# Patient Record
Sex: FEMALE | Race: WHITE | HISPANIC OR LATINO | URBAN - METROPOLITAN AREA
[De-identification: names, ages, dates, MRNs, and addresses within clinical notes are randomized per-mention and may not be internally consistent; named-entity substitution may affect disease eponyms.]

---

## 2014-12-05 RX ORDER — SERTRALINE 25 MG/1
2 TABLET, FILM COATED ORAL
Qty: 0 | Refills: 0 | COMMUNITY
Start: 2014-12-05

## 2014-12-05 RX ORDER — METFORMIN HYDROCHLORIDE 850 MG/1
1 TABLET ORAL
Qty: 0 | Refills: 0 | DISCHARGE
Start: 2014-12-05

## 2014-12-05 RX ORDER — INSULIN GLARGINE 100 [IU]/ML
45 INJECTION, SOLUTION SUBCUTANEOUS
Qty: 0 | Refills: 0 | DISCHARGE
Start: 2014-12-05

## 2018-02-13 ENCOUNTER — INPATIENT (INPATIENT)
Facility: HOSPITAL | Age: 63
LOS: 0 days | Discharge: LEFT BEFORE DISCHARGE | DRG: 379 | End: 2018-02-13
Attending: INTERNAL MEDICINE | Admitting: INTERNAL MEDICINE
Payer: COMMERCIAL

## 2018-02-13 VITALS — RESPIRATION RATE: 18 BRPM | OXYGEN SATURATION: 98 %

## 2018-02-13 VITALS
RESPIRATION RATE: 18 BRPM | TEMPERATURE: 98 F | OXYGEN SATURATION: 98 % | HEIGHT: 62 IN | SYSTOLIC BLOOD PRESSURE: 149 MMHG | DIASTOLIC BLOOD PRESSURE: 81 MMHG | WEIGHT: 199.96 LBS | HEART RATE: 83 BPM

## 2018-02-13 DIAGNOSIS — E11.9 TYPE 2 DIABETES MELLITUS WITHOUT COMPLICATIONS: ICD-10-CM

## 2018-02-13 DIAGNOSIS — K92.2 GASTROINTESTINAL HEMORRHAGE, UNSPECIFIED: ICD-10-CM

## 2018-02-13 DIAGNOSIS — F32.9 MAJOR DEPRESSIVE DISORDER, SINGLE EPISODE, UNSPECIFIED: ICD-10-CM

## 2018-02-13 DIAGNOSIS — E78.5 HYPERLIPIDEMIA, UNSPECIFIED: ICD-10-CM

## 2018-02-13 DIAGNOSIS — K62.5 HEMORRHAGE OF ANUS AND RECTUM: ICD-10-CM

## 2018-02-13 DIAGNOSIS — K46.9 UNSPECIFIED ABDOMINAL HERNIA WITHOUT OBSTRUCTION OR GANGRENE: Chronic | ICD-10-CM

## 2018-02-13 DIAGNOSIS — Z29.9 ENCOUNTER FOR PROPHYLACTIC MEASURES, UNSPECIFIED: ICD-10-CM

## 2018-02-13 LAB
ALBUMIN SERPL ELPH-MCNC: 3.2 G/DL — LOW (ref 3.5–5)
ALP SERPL-CCNC: 108 U/L — SIGNIFICANT CHANGE UP (ref 40–120)
ALT FLD-CCNC: 14 U/L DA — SIGNIFICANT CHANGE UP (ref 10–60)
ANION GAP SERPL CALC-SCNC: 8 MMOL/L — SIGNIFICANT CHANGE UP (ref 5–17)
ANION GAP SERPL CALC-SCNC: 9 MMOL/L — SIGNIFICANT CHANGE UP (ref 5–17)
APTT BLD: 29.9 SEC — SIGNIFICANT CHANGE UP (ref 27.5–37.4)
AST SERPL-CCNC: 15 U/L — SIGNIFICANT CHANGE UP (ref 10–40)
BASOPHILS # BLD AUTO: 0.1 K/UL — SIGNIFICANT CHANGE UP (ref 0–0.2)
BASOPHILS NFR BLD AUTO: 0.8 % — SIGNIFICANT CHANGE UP (ref 0–2)
BILIRUB SERPL-MCNC: 0.3 MG/DL — SIGNIFICANT CHANGE UP (ref 0.2–1.2)
BUN SERPL-MCNC: 12 MG/DL — SIGNIFICANT CHANGE UP (ref 7–18)
BUN SERPL-MCNC: 14 MG/DL — SIGNIFICANT CHANGE UP (ref 7–18)
CALCIUM SERPL-MCNC: 8.8 MG/DL — SIGNIFICANT CHANGE UP (ref 8.4–10.5)
CALCIUM SERPL-MCNC: 9.2 MG/DL — SIGNIFICANT CHANGE UP (ref 8.4–10.5)
CHLORIDE SERPL-SCNC: 104 MMOL/L — SIGNIFICANT CHANGE UP (ref 96–108)
CHLORIDE SERPL-SCNC: 105 MMOL/L — SIGNIFICANT CHANGE UP (ref 96–108)
CHOLEST SERPL-MCNC: 154 MG/DL — SIGNIFICANT CHANGE UP (ref 10–199)
CO2 SERPL-SCNC: 24 MMOL/L — SIGNIFICANT CHANGE UP (ref 22–31)
CO2 SERPL-SCNC: 27 MMOL/L — SIGNIFICANT CHANGE UP (ref 22–31)
CREAT SERPL-MCNC: 1 MG/DL — SIGNIFICANT CHANGE UP (ref 0.5–1.3)
CREAT SERPL-MCNC: 1.04 MG/DL — SIGNIFICANT CHANGE UP (ref 0.5–1.3)
EOSINOPHIL # BLD AUTO: 0.2 K/UL — SIGNIFICANT CHANGE UP (ref 0–0.5)
EOSINOPHIL NFR BLD AUTO: 2.2 % — SIGNIFICANT CHANGE UP (ref 0–6)
GLUCOSE SERPL-MCNC: 139 MG/DL — HIGH (ref 70–99)
GLUCOSE SERPL-MCNC: 154 MG/DL — HIGH (ref 70–99)
HCT VFR BLD CALC: 38.7 % — SIGNIFICANT CHANGE UP (ref 34.5–45)
HCT VFR BLD CALC: 39.8 % — SIGNIFICANT CHANGE UP (ref 34.5–45)
HDLC SERPL-MCNC: 51 MG/DL — SIGNIFICANT CHANGE UP (ref 40–125)
HGB BLD-MCNC: 12 G/DL — SIGNIFICANT CHANGE UP (ref 11.5–15.5)
HGB BLD-MCNC: 12.5 G/DL — SIGNIFICANT CHANGE UP (ref 11.5–15.5)
INR BLD: 1.04 RATIO — SIGNIFICANT CHANGE UP (ref 0.88–1.16)
LIPID PNL WITH DIRECT LDL SERPL: 63 MG/DL — SIGNIFICANT CHANGE UP
LYMPHOCYTES # BLD AUTO: 1.8 K/UL — SIGNIFICANT CHANGE UP (ref 1–3.3)
LYMPHOCYTES # BLD AUTO: 17.8 % — SIGNIFICANT CHANGE UP (ref 13–44)
MAGNESIUM SERPL-MCNC: 1.8 MG/DL — SIGNIFICANT CHANGE UP (ref 1.6–2.6)
MCHC RBC-ENTMCNC: 24.5 PG — LOW (ref 27–34)
MCHC RBC-ENTMCNC: 24.8 PG — LOW (ref 27–34)
MCHC RBC-ENTMCNC: 30.9 GM/DL — LOW (ref 32–36)
MCHC RBC-ENTMCNC: 31.4 GM/DL — LOW (ref 32–36)
MCV RBC AUTO: 79 FL — LOW (ref 80–100)
MCV RBC AUTO: 79.5 FL — LOW (ref 80–100)
MONOCYTES # BLD AUTO: 0.5 K/UL — SIGNIFICANT CHANGE UP (ref 0–0.9)
MONOCYTES NFR BLD AUTO: 4.8 % — SIGNIFICANT CHANGE UP (ref 2–14)
NEUTROPHILS # BLD AUTO: 7.4 K/UL — SIGNIFICANT CHANGE UP (ref 1.8–7.4)
NEUTROPHILS NFR BLD AUTO: 74.3 % — SIGNIFICANT CHANGE UP (ref 43–77)
PHOSPHATE SERPL-MCNC: 3.2 MG/DL — SIGNIFICANT CHANGE UP (ref 2.5–4.5)
PLATELET # BLD AUTO: 240 K/UL — SIGNIFICANT CHANGE UP (ref 150–400)
PLATELET # BLD AUTO: 256 K/UL — SIGNIFICANT CHANGE UP (ref 150–400)
POTASSIUM SERPL-MCNC: 4 MMOL/L — SIGNIFICANT CHANGE UP (ref 3.5–5.3)
POTASSIUM SERPL-MCNC: 4.4 MMOL/L — SIGNIFICANT CHANGE UP (ref 3.5–5.3)
POTASSIUM SERPL-SCNC: 4 MMOL/L — SIGNIFICANT CHANGE UP (ref 3.5–5.3)
POTASSIUM SERPL-SCNC: 4.4 MMOL/L — SIGNIFICANT CHANGE UP (ref 3.5–5.3)
PROT SERPL-MCNC: 6.8 G/DL — SIGNIFICANT CHANGE UP (ref 6–8.3)
PROTHROM AB SERPL-ACNC: 11.4 SEC — SIGNIFICANT CHANGE UP (ref 9.8–12.7)
RBC # BLD: 4.87 M/UL — SIGNIFICANT CHANGE UP (ref 3.8–5.2)
RBC # BLD: 5.04 M/UL — SIGNIFICANT CHANGE UP (ref 3.8–5.2)
RBC # FLD: 15.3 % — HIGH (ref 10.3–14.5)
RBC # FLD: 15.5 % — HIGH (ref 10.3–14.5)
SODIUM SERPL-SCNC: 138 MMOL/L — SIGNIFICANT CHANGE UP (ref 135–145)
SODIUM SERPL-SCNC: 139 MMOL/L — SIGNIFICANT CHANGE UP (ref 135–145)
TOTAL CHOLESTEROL/HDL RATIO MEASUREMENT: 3 RATIO — LOW (ref 3.3–7.1)
TRIGL SERPL-MCNC: 200 MG/DL — HIGH (ref 10–149)
TSH SERPL-MCNC: 1.91 UU/ML — SIGNIFICANT CHANGE UP (ref 0.34–4.82)
WBC # BLD: 10.5 K/UL — SIGNIFICANT CHANGE UP (ref 3.8–10.5)
WBC # BLD: 9.9 K/UL — SIGNIFICANT CHANGE UP (ref 3.8–10.5)
WBC # FLD AUTO: 10.5 K/UL — SIGNIFICANT CHANGE UP (ref 3.8–10.5)
WBC # FLD AUTO: 9.9 K/UL — SIGNIFICANT CHANGE UP (ref 3.8–10.5)

## 2018-02-13 PROCEDURE — 83036 HEMOGLOBIN GLYCOSYLATED A1C: CPT

## 2018-02-13 PROCEDURE — 84443 ASSAY THYROID STIM HORMONE: CPT

## 2018-02-13 PROCEDURE — 86850 RBC ANTIBODY SCREEN: CPT

## 2018-02-13 PROCEDURE — 93005 ELECTROCARDIOGRAM TRACING: CPT

## 2018-02-13 PROCEDURE — 84100 ASSAY OF PHOSPHORUS: CPT

## 2018-02-13 PROCEDURE — 99285 EMERGENCY DEPT VISIT HI MDM: CPT

## 2018-02-13 PROCEDURE — 99285 EMERGENCY DEPT VISIT HI MDM: CPT | Mod: 25

## 2018-02-13 PROCEDURE — 85610 PROTHROMBIN TIME: CPT

## 2018-02-13 PROCEDURE — 80048 BASIC METABOLIC PNL TOTAL CA: CPT

## 2018-02-13 PROCEDURE — 83735 ASSAY OF MAGNESIUM: CPT

## 2018-02-13 PROCEDURE — 80053 COMPREHEN METABOLIC PANEL: CPT

## 2018-02-13 PROCEDURE — 74177 CT ABD & PELVIS W/CONTRAST: CPT

## 2018-02-13 PROCEDURE — 85027 COMPLETE CBC AUTOMATED: CPT

## 2018-02-13 PROCEDURE — 85730 THROMBOPLASTIN TIME PARTIAL: CPT

## 2018-02-13 PROCEDURE — 86901 BLOOD TYPING SEROLOGIC RH(D): CPT

## 2018-02-13 PROCEDURE — 86900 BLOOD TYPING SEROLOGIC ABO: CPT

## 2018-02-13 PROCEDURE — 80061 LIPID PANEL: CPT

## 2018-02-13 PROCEDURE — 74177 CT ABD & PELVIS W/CONTRAST: CPT | Mod: 26

## 2018-02-13 PROCEDURE — 82962 GLUCOSE BLOOD TEST: CPT

## 2018-02-13 RX ORDER — OXYBUTYNIN CHLORIDE 5 MG
5 TABLET ORAL DAILY
Qty: 0 | Refills: 0 | Status: DISCONTINUED | OUTPATIENT
Start: 2018-02-13 | End: 2018-02-13

## 2018-02-13 RX ORDER — MECLIZINE HCL 12.5 MG
1 TABLET ORAL
Qty: 0 | Refills: 0 | COMMUNITY

## 2018-02-13 RX ORDER — OXYBUTYNIN CHLORIDE 5 MG
1 TABLET ORAL
Qty: 0 | Refills: 0 | COMMUNITY

## 2018-02-13 RX ORDER — SERTRALINE 25 MG/1
200 TABLET, FILM COATED ORAL AT BEDTIME
Qty: 0 | Refills: 0 | Status: DISCONTINUED | OUTPATIENT
Start: 2018-02-13 | End: 2018-02-13

## 2018-02-13 RX ORDER — BUPROPION HYDROCHLORIDE 150 MG/1
0 TABLET, EXTENDED RELEASE ORAL
Qty: 0 | Refills: 0 | COMMUNITY

## 2018-02-13 RX ORDER — INSULIN LISPRO 100/ML
VIAL (ML) SUBCUTANEOUS
Qty: 0 | Refills: 0 | Status: DISCONTINUED | OUTPATIENT
Start: 2018-02-13 | End: 2018-02-13

## 2018-02-13 RX ORDER — SODIUM CHLORIDE 9 MG/ML
1000 INJECTION INTRAMUSCULAR; INTRAVENOUS; SUBCUTANEOUS
Qty: 0 | Refills: 0 | Status: DISCONTINUED | OUTPATIENT
Start: 2018-02-13 | End: 2018-02-13

## 2018-02-13 RX ORDER — PANTOPRAZOLE SODIUM 20 MG/1
40 TABLET, DELAYED RELEASE ORAL
Qty: 0 | Refills: 0 | Status: DISCONTINUED | OUTPATIENT
Start: 2018-02-13 | End: 2018-02-13

## 2018-02-13 RX ORDER — SOD SULF/SODIUM/NAHCO3/KCL/PEG
4000 SOLUTION, RECONSTITUTED, ORAL ORAL ONCE
Qty: 0 | Refills: 0 | Status: COMPLETED | OUTPATIENT
Start: 2018-02-13 | End: 2018-02-13

## 2018-02-13 RX ORDER — ATORVASTATIN CALCIUM 80 MG/1
20 TABLET, FILM COATED ORAL AT BEDTIME
Qty: 0 | Refills: 0 | Status: DISCONTINUED | OUTPATIENT
Start: 2018-02-13 | End: 2018-02-13

## 2018-02-13 RX ADMIN — SODIUM CHLORIDE 80 MILLILITER(S): 9 INJECTION INTRAMUSCULAR; INTRAVENOUS; SUBCUTANEOUS at 15:00

## 2018-02-13 RX ADMIN — Medication 4000 MILLILITER(S): at 19:33

## 2018-02-13 RX ADMIN — PANTOPRAZOLE SODIUM 40 MILLIGRAM(S): 20 TABLET, DELAYED RELEASE ORAL at 18:57

## 2018-02-13 NOTE — DISCHARGE NOTE ADULT - PATIENT PORTAL LINK FT
You can access the [facility] Patient Portal, offered by [facility], by registering with the following website: [email or web address]

## 2018-02-13 NOTE — DISCHARGE NOTE ADULT - MEDICATION SUMMARY - MEDICATIONS TO TAKE
I will START or STAY ON the medications listed below when I get home from the hospital:    Xarelto 20 mg oral tablet  -- 1 tab(s) by mouth once a day (in the evening)  -- Indication: For Prophylactic measure    sertraline 100 mg oral tablet  -- 2 tab(s) by mouth once a day (at bedtime)  -- Indication: For Depression    metFORMIN 500 mg oral tablet  -- 1 tab(s) by mouth 2 times a day  -- Indication: For Diabetes    insulin glargine 100 units/mL subcutaneous solution  -- 45 unit(s) subcutaneous once a day (at bedtime)  -- Indication: For Diabetes    glipiZIDE  -- orally once a day  -- Indication: For Diabetes    Trulicity Pen  -- subcutaneous once a week  -- Indication: For Diabetes    meclizine 12.5 mg oral tablet  -- 1 tab(s) by mouth 3 times a day  -- Indication: For vertigo    fenofibrate 48 mg oral tablet  -- 1 tab(s) by mouth once a day (at bedtime)  -- Indication: For HLD    Lipitor 20 mg oral tablet  -- 1 tab(s) by mouth once a day (at bedtime)  -- Indication: For HLD    docusate sodium 100 mg oral capsule  -- 1 cap(s) by mouth 2 times a day  -- Indication: For constipation    senna oral tablet  -- 1 tab(s) by mouth 2 times a day  -- Indication: For constipation    buPROPion  -- orally once a day  -- Indication: For smoking    oxybutynin 5 mg oral tablet  -- 1 tab(s) orally  -- Indication: For incontinence
alert

## 2018-02-13 NOTE — H&P ADULT - NEGATIVE OPHTHALMOLOGIC SYMPTOMS
no diplopia/no lacrimation R/no blurred vision L/no photophobia/no blurred vision R/no lacrimation L

## 2018-02-13 NOTE — ED ADULT NURSE NOTE - OBJECTIVE STATEMENT
Pt. stated she woke up to use the bathroom and had a lot of blood after a bowel movement. Pt. stated she is a "little constipated".

## 2018-02-13 NOTE — H&P ADULT - PROBLEM SELECTOR PLAN 5
-Improved VTE score of 2 (age and immobilization)  -No pharmacologic DVT ppx 2/2 GI bleed  -SCDs for now  -GI ppx with Protonix.

## 2018-02-13 NOTE — H&P ADULT - ASSESSMENT
62 years old female from home, AAO x 3, ambulatory, stays with daughter and boyfriend, PMH of DM (On Lantus and Metformin), HLD and Depression and PSH of abdominal hernia repair (2005)  presented to ED with an episode of BRBPR while straining her bowel today.     In ED, patient is looking comfortable. Initial vitals: T 98.1, HR 83, /81, RR 18 and spO2 98% on room air. H/H upon admission 12/38.7. Rectal exam positive for BRBPR. CT abdomen: Apparent slightly hyperdense structure measuring approximately 2.8 cm in the rectum. This may represent stool, polyp, or rectal cancer. Admitted to medicine for further work up.

## 2018-02-13 NOTE — H&P ADULT - HISTORY OF PRESENT ILLNESS
62 years old female from home, AAO x 3, ambulatory, stays with daughter and boyfriend, PMH of DM (On Lantus and Metformin), HLD and Depression and PSH of abdominal hernia repair (2005)  presented to ED with an episode of BRBPR while straining her bowel today. Patient states that she went to [facility] yesterday for at 2 pm and later developed one episode of watery diarrhea in evening which resolved. Today early morning, patient was straining her bowels around 2:30 am, and had an episode of BRBPR. No more episodes since then. Currently denies any abdominal pain, nausea, vomiting, diarrhea, dizziness, headache, fever, chills, cough, chest pain, palpitations, sob or any other complains. No recent changes in weight or appetite, no recent travel, no recent antibiotic use. Denies being on blood thinner. Admits to taking Advil once in a while.     In ED, patient is looking comfortable. Initial vitals: T 98.1, HR 83, /81, RR 18 and spO2 98% on room air. H/H upon admission 12/38.7. Rectal exam positive for BRBPR. CT abdomen: Apparent slightly hyperdense structure measuring approximately 2.8 cm in the rectum. This may represent stool, polyp, or rectal cancer. Admitted to medicine for further work up. 62 years old female from home, AAO x 3, ambulatory, stays with daughter and boyfriend, PMH of DM (On Lantus and Metformin), HLD and Depression and PSH of abdominal hernia repair (2005)  presented to ED with an episode of BRBPR while straining her bowel today. Patient states that she went to [facility] yesterday for at 2 pm and later developed one episode of watery diarrhea in evening which resolved. Today early morning, patient was straining her bowels around 2:30 am, and had an episode of BRBPR. No more episodes since then. Currently denies any abdominal pain, nausea, vomiting, diarrhea, dizziness, headache, fever, chills, cough, chest pain, palpitations, sob or any other complains. No recent changes in weight or appetite, no recent travel, no recent antibiotic use. Denies being on blood thinner. Admits to taking Advil once in a while. Never had Colonoscopy in past.     In ED, patient is looking comfortable. Initial vitals: T 98.1, HR 83, /81, RR 18 and spO2 98% on room air. H/H upon admission 12/38.7. Rectal exam positive for BRBPR. CT abdomen: Apparent slightly hyperdense structure measuring approximately 2.8 cm in the rectum. This may represent stool, polyp, or rectal cancer. Admitted to medicine for further work up.

## 2018-02-13 NOTE — DISCHARGE NOTE ADULT - HOSPITAL COURSE
62 years old female from home, AAO x 3, ambulatory, stays with daughter and boyfriend, PMH of DM (On Lantus and Metformin), HLD and Depression and PSH of abdominal hernia repair (2005)  presented to ED with an episode of BRBPR while straining her bowel today. Patient states that she went to [facility] yesterday for at 2 pm and later developed one episode of watery diarrhea in evening which resolved. Today early morning, patient was straining her bowels around 2:30 am, and had an episode of BRBPR. No more episodes since then. Currently denies any abdominal pain, nausea, vomiting, diarrhea, dizziness, headache, fever, chills, cough, chest pain, palpitations, sob or any other complains. No recent changes in weight or appetite, no recent travel, no recent antibiotic use. Denies being on blood thinner. Admits to taking Advil once in a while. Never had Colonoscopy in past.     In ED, patient is looking comfortable. Initial vitals: T 98.1, HR 83, /81, RR 18 and spO2 98% on room air. H/H upon admission 12/38.7. Rectal exam positive for BRBPR. CT abdomen: Apparent slightly hyperdense structure measuring approximately 2.8 cm in the rectum. This may represent stool, polyp, or rectal cancer. Admitted to medicine for further work up.     In the hospital, you were treated for GI bleed. Please follow-up with your GI doctor.

## 2018-02-13 NOTE — DISCHARGE NOTE ADULT - CARE PLAN
Principal Discharge DX:	GI bleed  Goal:	Resolve  Assessment and plan of treatment:	Please continue medications, do not take any NSAIDs.

## 2018-02-13 NOTE — ED PROVIDER NOTE - OBJECTIVE STATEMENT
63 y/o F pt with PMHx of DM, HLD, presents to ED c/o rectal bleeding while straining her bowels x today. Pt denies fever, chills, nausea, vomiting, diarrhea, abd pain, H/O hemorrhoids, or any other complaints. NKDA.

## 2018-02-13 NOTE — ED ADULT NURSE REASSESSMENT NOTE - NS ED NURSE REASSESS COMMENT FT1
Pt. is drinking PO contrast for CT scan. No complaints voiced at this time. Pt. verbalized pain improvement since being here. Endorsed to DRAGAN Rios to provide continued care.
1830 patient BEGAN DRINKING GOLYLELY   1900 PATIENT REFUSING TO COMPLETE LAXATIVE  WANTS TI GO HOME   1910  DR MENDEZ SAW PAGED TO INFORM OF SAME.   1925 SALINE LOCK D/C   1935 AWAITING PAPERS FROM MD   2010 PATIENT WAS NOT AT BEDSIDE TO GIVE PAPER-PT LEFT PRIOR TO DISCHARGE PAPERS.  DR MENDEZ SAW MADE AWARE OF SAME.   BEGINNING AT 1830 PATIENT INFORMED OF THE IMPORTANCE OF REMAINING IN THE HOSPITAL FOR EVAL OF RECTAL BLEEDING.  PT REFUSED.
PATIENT WAS ADMITTED AND SCHEDULED FOR COLONOSCOPY IN THE MORNING. SALINE LOCK D/C

## 2018-02-13 NOTE — H&P ADULT - PROBLEM SELECTOR PLAN 1
-BRBPR x 1 episode, no prior history, not on any blood thinner, takes Advil once in a while.   -Rectal exam positive for bright red blood  -H/H upon admission 12/38.7  -Monitor serial H/H  -Check Orthostatics  -c/w gentle hydration, monitor vitals closely  -Avoid NSAIDs  -Clears for now  -Protonix 40 IV BID  -GI evaluation. -BRBPR x 1 episode, no prior history, not on any blood thinner, takes Advil once in a while.   -Rectal exam positive for bright red blood  -H/H upon admission 12/38.7  -Monitor serial H/H  -Check Orthostatics  -c/w gentle hydration, monitor vitals closely  -Avoid NSAIDs  -Clears for now  -Protonix 40 IV BID  -GI evaluation, Dr. Crowder consulted

## 2018-02-13 NOTE — H&P ADULT - PROBLEM SELECTOR PLAN 2
-Patient is on Lantus 45 U qhs, Metformin 500 BID and Trulicity once a week.  -Will hold Metformin and Trulicity for now.  -Continue Lantus at half the home dose along with HSS  -Monitor accu checks and adjust insulin regimen as needed  -f/u HbA1c

## 2018-02-13 NOTE — H&P ADULT - FAMILY HISTORY
Grandparent  Still living? Unknown  Family history of diabetes mellitus, Age at diagnosis: Age Unknown     Father  Still living? Unknown  Family history of prostate cancer, Age at diagnosis: Age Unknown

## 2018-02-13 NOTE — ED PROVIDER NOTE - RECTAL
bright red blood on rectal with 4cm x 4cm rectal mass protruding from rectum which was reducible bright red blood on rectal with 4cm x 4cm rectal mass protruding from rectum

## 2018-02-14 LAB — HBA1C BLD-MCNC: 6.8 % — HIGH (ref 4–5.6)

## 2022-05-31 NOTE — ED ADULT NURSE NOTE - EXTENSIONS OF SELF_ADULT
"  Subjective:       Patient ID: Rachel Morales is a 44 y.o. female.    Chief Complaint: Annual Exam    44-year-old  female patient with Patient Active Problem List:     Obesity (BMI 30.0-34.9)  Here for routine annual physicals.   Denies any extreme fatigue and menstrual cycles have been stable, LMP 05/17/2022, reports occasionally heavy cycles  Denies any extreme fatigue    Review of Systems   Constitutional: Positive for unexpected weight change. Negative for activity change and fatigue.   HENT: Negative for hearing loss, rhinorrhea and trouble swallowing.    Eyes: Negative for discharge and visual disturbance.   Respiratory: Negative for chest tightness, shortness of breath and wheezing.    Cardiovascular: Negative for chest pain, palpitations and leg swelling.   Gastrointestinal: Negative for abdominal pain, blood in stool, constipation, diarrhea, nausea and vomiting.   Endocrine: Negative for polydipsia and polyuria.   Genitourinary: Positive for menstrual problem. Negative for difficulty urinating, dysuria and hematuria.   Musculoskeletal: Positive for neck pain. Negative for arthralgias, joint swelling and myalgias.   Skin: Negative for rash.   Neurological: Negative for weakness, light-headedness and headaches.   Psychiatric/Behavioral: Negative for confusion, dysphoric mood and sleep disturbance.         /76 (BP Location: Right arm, Patient Position: Sitting, BP Method: Large (Manual))   Pulse 73   Resp 16   Ht 5' 7" (1.702 m)   Wt 87 kg (191 lb 12.8 oz)   SpO2 99%   BMI 30.04 kg/m²   Objective:      Physical Exam  Constitutional:       Appearance: She is well-developed.   HENT:      Head: Normocephalic and atraumatic.   Cardiovascular:      Rate and Rhythm: Normal rate and regular rhythm.      Heart sounds: Normal heart sounds. No murmur heard.  Pulmonary:      Effort: Pulmonary effort is normal.      Breath sounds: Normal breath sounds. No wheezing.   Abdominal:      " General: Bowel sounds are normal.      Palpations: Abdomen is soft.      Tenderness: There is no abdominal tenderness.   Skin:     General: Skin is warm and dry.      Findings: No rash.   Neurological:      Mental Status: She is alert and oriented to person, place, and time.   Psychiatric:         Mood and Affect: Mood normal.           Assessment/Plan:   1. Routine general medical examination at a health care facility  - CBC Auto Differential; Future  - Comprehensive Metabolic Panel; Future  - Lipid Panel; Future  - TSH; Future  - Urinalysis, Reflex to Urine Culture Urine, Clean Catch; Future  - Hepatitis C Antibody; Future  - Mammo Digital Screening Bilat w/ Oleg; Future  - Hemoglobin A1C; Future  Vital signs stable today.  Clinical exam stable  Continue lifestyle modifications with low-fat and low-cholesterol diet and exercise 30 minutes daily      2. Iron deficiency anemia, unspecified iron deficiency anemia type  - CBC Auto Differential; Future  - Iron and TIBC; Future  - Ferritin; Future  Will check further labs  Encouraged to eat iron and protein rich diet      3. Obesity (BMI 30.0-34.9)   Lifestyle modifications recommended to lose weight with BMI 30 with diet and exercise       None

## 2022-06-03 ENCOUNTER — INPATIENT (INPATIENT)
Facility: HOSPITAL | Age: 67
LOS: 4 days | Discharge: NURSING, REHAB OR LONG-TERM CARE | DRG: 355 | End: 2022-06-08
Attending: SURGERY | Admitting: SURGERY
Payer: COMMERCIAL

## 2022-06-03 VITALS
TEMPERATURE: 98 F | SYSTOLIC BLOOD PRESSURE: 147 MMHG | OXYGEN SATURATION: 98 % | HEIGHT: 62 IN | DIASTOLIC BLOOD PRESSURE: 88 MMHG | RESPIRATION RATE: 18 BRPM | WEIGHT: 199.08 LBS | HEART RATE: 76 BPM

## 2022-06-03 DIAGNOSIS — K46.9 UNSPECIFIED ABDOMINAL HERNIA WITHOUT OBSTRUCTION OR GANGRENE: Chronic | ICD-10-CM

## 2022-06-03 PROCEDURE — 99285 EMERGENCY DEPT VISIT HI MDM: CPT

## 2022-06-03 RX ORDER — ACETAMINOPHEN 500 MG
1000 TABLET ORAL ONCE
Refills: 0 | Status: COMPLETED | OUTPATIENT
Start: 2022-06-03 | End: 2022-06-04

## 2022-06-03 RX ORDER — ONDANSETRON 8 MG/1
4 TABLET, FILM COATED ORAL ONCE
Refills: 0 | Status: COMPLETED | OUTPATIENT
Start: 2022-06-03 | End: 2022-06-03

## 2022-06-03 RX ORDER — SODIUM CHLORIDE 9 MG/ML
1000 INJECTION INTRAMUSCULAR; INTRAVENOUS; SUBCUTANEOUS ONCE
Refills: 0 | Status: COMPLETED | OUTPATIENT
Start: 2022-06-03 | End: 2022-06-03

## 2022-06-04 DIAGNOSIS — K56.609 UNSPECIFIED INTESTINAL OBSTRUCTION, UNSPECIFIED AS TO PARTIAL VERSUS COMPLETE OBSTRUCTION: ICD-10-CM

## 2022-06-04 PROBLEM — F32.9 MAJOR DEPRESSIVE DISORDER, SINGLE EPISODE, UNSPECIFIED: Chronic | Status: ACTIVE | Noted: 2018-02-13

## 2022-06-04 LAB
ALBUMIN SERPL ELPH-MCNC: 2.8 G/DL — LOW (ref 3.5–5)
ALP SERPL-CCNC: 78 U/L — SIGNIFICANT CHANGE UP (ref 40–120)
ALT FLD-CCNC: 14 U/L DA — SIGNIFICANT CHANGE UP (ref 10–60)
ANION GAP SERPL CALC-SCNC: 9 MMOL/L — SIGNIFICANT CHANGE UP (ref 5–17)
APPEARANCE UR: ABNORMAL
AST SERPL-CCNC: 11 U/L — SIGNIFICANT CHANGE UP (ref 10–40)
BACTERIA # UR AUTO: ABNORMAL /HPF
BASOPHILS # BLD AUTO: 0.06 K/UL — SIGNIFICANT CHANGE UP (ref 0–0.2)
BASOPHILS NFR BLD AUTO: 0.6 % — SIGNIFICANT CHANGE UP (ref 0–2)
BILIRUB SERPL-MCNC: 0.5 MG/DL — SIGNIFICANT CHANGE UP (ref 0.2–1.2)
BILIRUB UR-MCNC: NEGATIVE — SIGNIFICANT CHANGE UP
BUN SERPL-MCNC: 15 MG/DL — SIGNIFICANT CHANGE UP (ref 7–18)
CALCIUM SERPL-MCNC: 9.6 MG/DL — SIGNIFICANT CHANGE UP (ref 8.4–10.5)
CHLORIDE SERPL-SCNC: 106 MMOL/L — SIGNIFICANT CHANGE UP (ref 96–108)
CO2 SERPL-SCNC: 25 MMOL/L — SIGNIFICANT CHANGE UP (ref 22–31)
COLOR SPEC: YELLOW — SIGNIFICANT CHANGE UP
COMMENT - URINE: SIGNIFICANT CHANGE UP
CREAT SERPL-MCNC: 0.85 MG/DL — SIGNIFICANT CHANGE UP (ref 0.5–1.3)
DIFF PNL FLD: NEGATIVE — SIGNIFICANT CHANGE UP
EGFR: 75 ML/MIN/1.73M2 — SIGNIFICANT CHANGE UP
EOSINOPHIL # BLD AUTO: 0.19 K/UL — SIGNIFICANT CHANGE UP (ref 0–0.5)
EOSINOPHIL NFR BLD AUTO: 1.8 % — SIGNIFICANT CHANGE UP (ref 0–6)
EPI CELLS # UR: ABNORMAL /HPF
GLUCOSE BLDC GLUCOMTR-MCNC: 181 MG/DL — HIGH (ref 70–99)
GLUCOSE SERPL-MCNC: 245 MG/DL — HIGH (ref 70–99)
GLUCOSE UR QL: 1000 MG/DL
HCT VFR BLD CALC: 37.9 % — SIGNIFICANT CHANGE UP (ref 34.5–45)
HGB BLD-MCNC: 12.8 G/DL — SIGNIFICANT CHANGE UP (ref 11.5–15.5)
IMM GRANULOCYTES NFR BLD AUTO: 0.5 % — SIGNIFICANT CHANGE UP (ref 0–1.5)
KETONES UR-MCNC: NEGATIVE — SIGNIFICANT CHANGE UP
LEUKOCYTE ESTERASE UR-ACNC: ABNORMAL
LIDOCAIN IGE QN: 123 U/L — SIGNIFICANT CHANGE UP (ref 73–393)
LYMPHOCYTES # BLD AUTO: 1.2 K/UL — SIGNIFICANT CHANGE UP (ref 1–3.3)
LYMPHOCYTES # BLD AUTO: 11.6 % — LOW (ref 13–44)
MCHC RBC-ENTMCNC: 25.8 PG — LOW (ref 27–34)
MCHC RBC-ENTMCNC: 33.8 GM/DL — SIGNIFICANT CHANGE UP (ref 32–36)
MCV RBC AUTO: 76.3 FL — LOW (ref 80–100)
MONOCYTES # BLD AUTO: 0.53 K/UL — SIGNIFICANT CHANGE UP (ref 0–0.9)
MONOCYTES NFR BLD AUTO: 5.1 % — SIGNIFICANT CHANGE UP (ref 2–14)
NEUTROPHILS # BLD AUTO: 8.34 K/UL — HIGH (ref 1.8–7.4)
NEUTROPHILS NFR BLD AUTO: 80.4 % — HIGH (ref 43–77)
NITRITE UR-MCNC: NEGATIVE — SIGNIFICANT CHANGE UP
NRBC # BLD: 0 /100 WBCS — SIGNIFICANT CHANGE UP (ref 0–0)
PH UR: 6 — SIGNIFICANT CHANGE UP (ref 5–8)
PLATELET # BLD AUTO: 355 K/UL — SIGNIFICANT CHANGE UP (ref 150–400)
POTASSIUM SERPL-MCNC: 3.7 MMOL/L — SIGNIFICANT CHANGE UP (ref 3.5–5.3)
POTASSIUM SERPL-SCNC: 3.7 MMOL/L — SIGNIFICANT CHANGE UP (ref 3.5–5.3)
PROT SERPL-MCNC: 7 G/DL — SIGNIFICANT CHANGE UP (ref 6–8.3)
PROT UR-MCNC: NEGATIVE — SIGNIFICANT CHANGE UP
RBC # BLD: 4.97 M/UL — SIGNIFICANT CHANGE UP (ref 3.8–5.2)
RBC # FLD: 16.5 % — HIGH (ref 10.3–14.5)
RBC CASTS # UR COMP ASSIST: SIGNIFICANT CHANGE UP /HPF (ref 0–2)
SARS-COV-2 RNA SPEC QL NAA+PROBE: SIGNIFICANT CHANGE UP
SODIUM SERPL-SCNC: 140 MMOL/L — SIGNIFICANT CHANGE UP (ref 135–145)
SP GR SPEC: 1.02 — SIGNIFICANT CHANGE UP (ref 1.01–1.02)
UROBILINOGEN FLD QL: NEGATIVE — SIGNIFICANT CHANGE UP
WBC # BLD: 10.37 K/UL — SIGNIFICANT CHANGE UP (ref 3.8–10.5)
WBC # FLD AUTO: 10.37 K/UL — SIGNIFICANT CHANGE UP (ref 3.8–10.5)
WBC UR QL: ABNORMAL /HPF (ref 0–5)

## 2022-06-04 PROCEDURE — 99233 SBSQ HOSP IP/OBS HIGH 50: CPT | Mod: GC

## 2022-06-04 PROCEDURE — 74176 CT ABD & PELVIS W/O CONTRAST: CPT | Mod: 26,MA

## 2022-06-04 PROCEDURE — 93010 ELECTROCARDIOGRAM REPORT: CPT

## 2022-06-04 PROCEDURE — 99223 1ST HOSP IP/OBS HIGH 75: CPT | Mod: 57

## 2022-06-04 DEVICE — DEVICE ABSORBATACK 5MM 30 TACK FIXATION: Type: IMPLANTABLE DEVICE | Status: FUNCTIONAL

## 2022-06-04 DEVICE — MESH HERNIA VENTRALIGHT ST CIRCLE 4.5IN: Type: IMPLANTABLE DEVICE | Status: FUNCTIONAL

## 2022-06-04 DEVICE — IMPLANTABLE DEVICE: Type: IMPLANTABLE DEVICE | Status: FUNCTIONAL

## 2022-06-04 DEVICE — CLIP APPLIER COVIDIEN ENDOCLIP III 5MM: Type: IMPLANTABLE DEVICE | Status: FUNCTIONAL

## 2022-06-04 DEVICE — MESH HERNIA VENTRALIGHT ST ELLIPSE 6X8IN: Type: IMPLANTABLE DEVICE | Status: FUNCTIONAL

## 2022-06-04 DEVICE — ETHICON SECURESTRAP FIXATION DEVICE: Type: IMPLANTABLE DEVICE | Status: FUNCTIONAL

## 2022-06-04 DEVICE — MESH HERNIA VENTRALIGHT ST ELLIPSE 4X6IN: Type: IMPLANTABLE DEVICE | Status: FUNCTIONAL

## 2022-06-04 RX ORDER — CHLORHEXIDINE GLUCONATE 213 G/1000ML
1 SOLUTION TOPICAL DAILY
Refills: 0 | Status: COMPLETED | OUTPATIENT
Start: 2022-06-04 | End: 2022-06-06

## 2022-06-04 RX ORDER — DEXTROSE 50 % IN WATER 50 %
25 SYRINGE (ML) INTRAVENOUS ONCE
Refills: 0 | Status: DISCONTINUED | OUTPATIENT
Start: 2022-06-04 | End: 2022-06-08

## 2022-06-04 RX ORDER — SODIUM CHLORIDE 9 MG/ML
1000 INJECTION, SOLUTION INTRAVENOUS
Refills: 0 | Status: DISCONTINUED | OUTPATIENT
Start: 2022-06-04 | End: 2022-06-06

## 2022-06-04 RX ORDER — INSULIN GLARGINE 100 [IU]/ML
10 INJECTION, SOLUTION SUBCUTANEOUS EVERY MORNING
Refills: 0 | Status: DISCONTINUED | OUTPATIENT
Start: 2022-06-04 | End: 2022-06-05

## 2022-06-04 RX ORDER — SODIUM CHLORIDE 9 MG/ML
1000 INJECTION, SOLUTION INTRAVENOUS
Refills: 0 | Status: DISCONTINUED | OUTPATIENT
Start: 2022-06-04 | End: 2022-06-08

## 2022-06-04 RX ORDER — ONDANSETRON 8 MG/1
4 TABLET, FILM COATED ORAL EVERY 6 HOURS
Refills: 0 | Status: DISCONTINUED | OUTPATIENT
Start: 2022-06-04 | End: 2022-06-08

## 2022-06-04 RX ORDER — KETOROLAC TROMETHAMINE 30 MG/ML
30 SYRINGE (ML) INJECTION ONCE
Refills: 0 | Status: DISCONTINUED | OUTPATIENT
Start: 2022-06-04 | End: 2022-06-04

## 2022-06-04 RX ORDER — GLUCAGON INJECTION, SOLUTION 0.5 MG/.1ML
1 INJECTION, SOLUTION SUBCUTANEOUS ONCE
Refills: 0 | Status: DISCONTINUED | OUTPATIENT
Start: 2022-06-04 | End: 2022-06-08

## 2022-06-04 RX ORDER — ENOXAPARIN SODIUM 100 MG/ML
40 INJECTION SUBCUTANEOUS EVERY 12 HOURS
Refills: 0 | Status: DISCONTINUED | OUTPATIENT
Start: 2022-06-04 | End: 2022-06-05

## 2022-06-04 RX ORDER — INSULIN LISPRO 100/ML
VIAL (ML) SUBCUTANEOUS AT BEDTIME
Refills: 0 | Status: DISCONTINUED | OUTPATIENT
Start: 2022-06-04 | End: 2022-06-06

## 2022-06-04 RX ORDER — DEXTROSE 50 % IN WATER 50 %
12.5 SYRINGE (ML) INTRAVENOUS ONCE
Refills: 0 | Status: DISCONTINUED | OUTPATIENT
Start: 2022-06-04 | End: 2022-06-08

## 2022-06-04 RX ORDER — DEXTROSE 50 % IN WATER 50 %
15 SYRINGE (ML) INTRAVENOUS ONCE
Refills: 0 | Status: DISCONTINUED | OUTPATIENT
Start: 2022-06-04 | End: 2022-06-08

## 2022-06-04 RX ADMIN — SODIUM CHLORIDE 1000 MILLILITER(S): 9 INJECTION INTRAMUSCULAR; INTRAVENOUS; SUBCUTANEOUS at 00:03

## 2022-06-04 RX ADMIN — Medication 30 MILLIGRAM(S): at 13:30

## 2022-06-04 RX ADMIN — INSULIN GLARGINE 10 UNIT(S): 100 INJECTION, SOLUTION SUBCUTANEOUS at 10:20

## 2022-06-04 RX ADMIN — Medication 1000 MILLIGRAM(S): at 00:34

## 2022-06-04 RX ADMIN — ENOXAPARIN SODIUM 40 MILLIGRAM(S): 100 INJECTION SUBCUTANEOUS at 17:43

## 2022-06-04 RX ADMIN — Medication 30 MILLIGRAM(S): at 13:14

## 2022-06-04 RX ADMIN — ONDANSETRON 4 MILLIGRAM(S): 8 TABLET, FILM COATED ORAL at 00:04

## 2022-06-04 RX ADMIN — SODIUM CHLORIDE 130 MILLILITER(S): 9 INJECTION, SOLUTION INTRAVENOUS at 17:43

## 2022-06-04 RX ADMIN — ENOXAPARIN SODIUM 40 MILLIGRAM(S): 100 INJECTION SUBCUTANEOUS at 05:02

## 2022-06-04 RX ADMIN — Medication 400 MILLIGRAM(S): at 00:04

## 2022-06-04 NOTE — H&P ADULT - ASSESSMENT
67 y.o. F with SBO secondary to recurrent incarcerated incisional hernia    -Admit to surgery under Dr. Delvalle  -NGT attempted at bedside. Pt did not tolerate procedure. States would rather die than have the tube placed.  -Keep NPO  -IVF  -No pain medication  -DVT ppx  -Poss OR to repair hernia    DM  -ISS  -10 Lantus per medical team rec

## 2022-06-04 NOTE — ED ADULT NURSE NOTE - OBJECTIVE STATEMENT
66 yo female lying on bed c/o right-sided abdominal pain associated with nausea and vomiting that started 5 days ago.

## 2022-06-04 NOTE — ED PROVIDER NOTE - NSICDXFAMILYHX_GEN_ALL_CORE_FT
FAMILY HISTORY:  Father  Still living? Unknown  Family history of prostate cancer, Age at diagnosis: Age Unknown    Grandparent  Still living? Unknown  Family history of diabetes mellitus, Age at diagnosis: Age Unknown

## 2022-06-04 NOTE — ED PROVIDER NOTE - CLINICAL SUMMARY MEDICAL DECISION MAKING FREE TEXT BOX
Patient presenting with abd pain, vomiting, vital stable. will obtain lab, ct, r.o surgical abd. ed obs and reassess

## 2022-06-04 NOTE — PATIENT PROFILE ADULT - LIVING ENVIRONMENT
[FreeTextEntry1] : Followup diabetes. [de-identified] : Patient is a 65-year-old male with insulin-dependent diabetes. He monitors his blood sugar frequently. He is followed by an endocrinologist. He generally feels well without chest pain or shortness of breath. He has no cardiac history. He has no history of retinopathy or nephropathy. His only complaint recently is a rash which has been going on for more than one month. It is on his upper abdomen region with pustules. He had a biopsy done by a dermatologist. He is awaiting the results. no

## 2022-06-04 NOTE — PROGRESS NOTE ADULT - SUBJECTIVE AND OBJECTIVE BOX
INTERVAL HPI/OVERNIGHT EVENTS:    Pt seen and examined at bedside. Admits to abdominal discomfort at the hernia site, no flatus/ bm   Pt in NPO    Vital Signs Last 24 Hrs  T(C): 36.8 (04 Jun 2022 05:40), Max: 36.8 (03 Jun 2022 23:01)  T(F): 98.2 (04 Jun 2022 05:40), Max: 98.2 (03 Jun 2022 23:01)  HR: 75 (04 Jun 2022 05:40) (75 - 76)  BP: 126/75 (04 Jun 2022 05:40) (114/68 - 147/88)  BP(mean): --  RR: 18 (04 Jun 2022 05:40) (18 - 18)  SpO2: 100% (04 Jun 2022 05:40) (96% - 100%)  I&O's Detail        Physical Exam  General: AAOx3, No acute distress  Skin: No jaundice, no icterus  Abdomen: obese, ND, Large soft RLQ hernia containing bowel, min TTP, irreducible, no overlying skin changes. Unable to palpate fascial defect        Labs:                        12.8   10.37 )-----------( 355      ( 04 Jun 2022 00:13 )             37.9     06-04    140  |  106  |  15  ----------------------------<  245<H>  3.7   |  25  |  0.85    Ca    9.6      04 Jun 2022 00:13    TPro  7.0  /  Alb  2.8<L>  /  TBili  0.5  /  DBili  x   /  AST  11  /  ALT  14  /  AlkPhos  78  06-04

## 2022-06-04 NOTE — H&P ADULT - GASTROINTESTINAL COMMENTS
Dry flaky skin. Large soft RLQ hernia containing bowel, irreducible, no overlying skin changes. Unable to palpate fascial defect

## 2022-06-04 NOTE — H&P ADULT - HISTORY OF PRESENT ILLNESS
67 y.o. F with PMH DM, DKAx2 in 2014,left leg  DVT, HLD and Depression and PSH of abdominal hernia repair () presents to [facility] ER c/o abd pain for 5 days. Pt states at around 3AM Monday morning she had sharp pain in lower abd. Associated with nausea and vomiting. Last BM Wednesday, small amount. Passing flatus yesterday and today. Pt not very aware of lower abd distention, states she guess she must have had it for a long time. Denies fever, chills, sudden increase in intraabd pressure i.e. excessive bending, hard sneeze/cough. In , pt had elective surgery for incisional hernia repair of  incision. Pt recalls mesh was used. Pt states she returned to the hospital the next day with pain and needing surgery again. Pt currently resting comfortably in bed.

## 2022-06-04 NOTE — CHART NOTE - NSCHARTNOTEFT_GEN_A_CORE
INCOMPLETE:    Patient is a 66 yo. F with PMH DM, DKAx2 in 2014,l eft leg  DVT (was on Xarelto), HLD and Depression and PSH of abdominal hernia repair (2005) with c/o sharp lower abdominal pain. admitted to surgery service for SBO 2/2 incarcerated inguinal hernia. Admitted for SBO 2/2 incarcerated hernia. Plan for laparoscopic hernia repair with mesh 6/5 , surgery requested medical clearance.     Pre-op evaluation:  -Patient denies any chest pain, shortness of breath, palpitations, pre-syncope or syncope.  RCRI 2 points, class III Risk 10.1% 30 day risk of death, MI or cardiac arrest. Walters Score- 0.7% Risk of MI or cardiac arrest, intraoperative or up to 30 days post-op. Unable to assess functional capacity as she is mostly home-bound and ambulates with assistance of walker. EKG- Patient is a 68 yo. F with PMH DM, DKAx2 in 2014,l eft leg  DVT (was on Xarelto), HLD and Depression and PSH of abdominal hernia repair (2005) with c/o sharp lower abdominal pain. admitted to surgery service for SBO 2/2 incarcerated inguinal hernia. Admitted for SBO 2/2 incarcerated hernia. Plan for laparoscopic hernia repair with mesh 6/5 , surgery requested medical clearance.    Pre-op Evaluation:   -Patient denies any chest pain, shortness of breath, palpitations, pre-syncope or syncope. No prior hx of CAD, CHF or valvular abnormalities.  RCRI 2 points, class III Risk 10.1% 30 day risk of death, MI or cardiac arrest. Walters Score- 0.7% Risk of MI or cardiac arrest, intraoperative or up to 30 days post-op. Unable to assess functional capacity as she is mostly home-bound and ambulates with assistance of walker. EKG-NSR. Patient is medically optimized at this time, no further testing warranted. Patient is a 68 yo. F with PMH DM, DKAx2 in 2014,l eft leg  DVT (was on Xarelto), HLD and Depression and PSH of abdominal hernia repair (2005) with c/o sharp lower abdominal pain. admitted to surgery service for SBO 2/2 incarcerated inguinal hernia. Admitted for SBO 2/2 incarcerated hernia. Plan for laparoscopic hernia repair with mesh 6/5 , surgery requested medical clearance.    Pre-op Evaluation:   -Patient denies any chest pain, shortness of breath, palpitations, pre-syncope or syncope. No prior hx of CAD, CHF or valvular abnormalities.  RCRI 2 points, class III Risk 10.1% 30 day risk of death, MI or cardiac arrest. Walters Score- 0.7% Risk of MI or cardiac arrest, intraoperative or up to 30 days post-op. Unable to assess functional capacity as she is mostly home-bound and ambulates with assistance of walker. EKG-NSR. Patient is medically optimized at this time, no further testing warranted. Keep NPO, obtain pre-op labs, hold DVT PPx.

## 2022-06-04 NOTE — PROGRESS NOTE ADULT - ASSESSMENT
67 y.o. F with SBO secondary to recurrent incarcerated incisional hernia    -Plan for laparoscopic hernia repair with mesh 6/5   -Pre op labs   -Medical clearance for OR, please not in chart   -NPO  -IVF  -DVT ppx    DM  -ISS  -10 Lantus per medical team rec

## 2022-06-04 NOTE — PATIENT PROFILE ADULT - FALL HARM RISK - RISK INTERVENTIONS

## 2022-06-04 NOTE — ED PROVIDER NOTE - OBJECTIVE STATEMENT
68 y/o female with history of gallstones, DM, p/w abdominal pain x 5 days. Patient endorses loss of appetite, nausea, and vomiting. Patient denies any diarrhea or dysuria.

## 2022-06-04 NOTE — CONSULT NOTE ADULT - ATTENDING COMMENTS
Patient is a 68 yo. F with PMH DM, DKAx2 in 2014,l eft leg  DVT (was on Xarelto), HLD and Depression and PSH of abdominal hernia repair (2005) with c/o sharp lower abdominal pain. admitted to surgery service for SBO 2/2 incarcerated inguinal hernia. Medicine team consulted  for Diabetes management.    # SBO 2/2 incarcerated inguinal hernia  - patient refused NG tube  - patient NPO, possible operative management as per surgery      # Type 2 DM: on po hypoglycemics and basal Lantus  pt with history of DKA X2 in 2014 , h/o non complaint with medication   - please confirm medication with pharmacy.  - patient NPO  - hold oral hypoglycemics, started on   - will hold all po meds and started on lantus 10 units, and SSI, FSG q6h.  - check HbA1c    # HLD  # h/o DVT  # Depression  - resume home meds  DVT ppx: s/c Lovenox.

## 2022-06-04 NOTE — H&P ADULT - NS ATTEND AMEND GEN_ALL_CORE FT
Recurrent ventral hernia with partial SBO.     Plan for urgent repair within 24 hours  Medical preop eval

## 2022-06-04 NOTE — ED ADULT NURSE NOTE - NSIMPLEMENTINTERV_GEN_ALL_ED
Implemented All Universal Safety Interventions:  [place] to call system. Call bell, personal items and telephone within reach. Instruct patient to call for assistance. Room bathroom lighting operational. Non-slip footwear when patient is off stretcher. Physically safe environment: no spills, clutter or unnecessary equipment. Stretcher in lowest position, wheels locked, appropriate side rails in place.

## 2022-06-05 LAB
A1C WITH ESTIMATED AVERAGE GLUCOSE RESULT: 12.8 % — HIGH (ref 4–5.6)
ANION GAP SERPL CALC-SCNC: 9 MMOL/L — SIGNIFICANT CHANGE UP (ref 5–17)
APTT BLD: 29.6 SEC — SIGNIFICANT CHANGE UP (ref 27.5–35.5)
BASOPHILS # BLD AUTO: 0.05 K/UL — SIGNIFICANT CHANGE UP (ref 0–0.2)
BASOPHILS NFR BLD AUTO: 0.7 % — SIGNIFICANT CHANGE UP (ref 0–2)
BUN SERPL-MCNC: 10 MG/DL — SIGNIFICANT CHANGE UP (ref 7–18)
CALCIUM SERPL-MCNC: 8.4 MG/DL — SIGNIFICANT CHANGE UP (ref 8.4–10.5)
CHLORIDE SERPL-SCNC: 115 MMOL/L — HIGH (ref 96–108)
CO2 SERPL-SCNC: 22 MMOL/L — SIGNIFICANT CHANGE UP (ref 22–31)
CREAT SERPL-MCNC: 0.88 MG/DL — SIGNIFICANT CHANGE UP (ref 0.5–1.3)
CULTURE RESULTS: SIGNIFICANT CHANGE UP
EGFR: 72 ML/MIN/1.73M2 — SIGNIFICANT CHANGE UP
EOSINOPHIL # BLD AUTO: 0.29 K/UL — SIGNIFICANT CHANGE UP (ref 0–0.5)
EOSINOPHIL NFR BLD AUTO: 3.9 % — SIGNIFICANT CHANGE UP (ref 0–6)
ESTIMATED AVERAGE GLUCOSE: 321 MG/DL — HIGH (ref 68–114)
GLUCOSE BLDC GLUCOMTR-MCNC: 207 MG/DL — HIGH (ref 70–99)
GLUCOSE BLDC GLUCOMTR-MCNC: 224 MG/DL — HIGH (ref 70–99)
GLUCOSE BLDC GLUCOMTR-MCNC: 310 MG/DL — HIGH (ref 70–99)
GLUCOSE SERPL-MCNC: 234 MG/DL — HIGH (ref 70–99)
HCT VFR BLD CALC: 36.4 % — SIGNIFICANT CHANGE UP (ref 34.5–45)
HCV AB S/CO SERPL IA: 0.13 S/CO — SIGNIFICANT CHANGE UP (ref 0–0.99)
HCV AB SERPL-IMP: SIGNIFICANT CHANGE UP
HGB BLD-MCNC: 11.8 G/DL — SIGNIFICANT CHANGE UP (ref 11.5–15.5)
IMM GRANULOCYTES NFR BLD AUTO: 0.3 % — SIGNIFICANT CHANGE UP (ref 0–1.5)
INR BLD: 1.06 RATIO — SIGNIFICANT CHANGE UP (ref 0.88–1.16)
LYMPHOCYTES # BLD AUTO: 1.54 K/UL — SIGNIFICANT CHANGE UP (ref 1–3.3)
LYMPHOCYTES # BLD AUTO: 21 % — SIGNIFICANT CHANGE UP (ref 13–44)
MCHC RBC-ENTMCNC: 25.2 PG — LOW (ref 27–34)
MCHC RBC-ENTMCNC: 32.4 GM/DL — SIGNIFICANT CHANGE UP (ref 32–36)
MCV RBC AUTO: 77.8 FL — LOW (ref 80–100)
MONOCYTES # BLD AUTO: 0.43 K/UL — SIGNIFICANT CHANGE UP (ref 0–0.9)
MONOCYTES NFR BLD AUTO: 5.9 % — SIGNIFICANT CHANGE UP (ref 2–14)
NEUTROPHILS # BLD AUTO: 5.02 K/UL — SIGNIFICANT CHANGE UP (ref 1.8–7.4)
NEUTROPHILS NFR BLD AUTO: 68.2 % — SIGNIFICANT CHANGE UP (ref 43–77)
NRBC # BLD: 0 /100 WBCS — SIGNIFICANT CHANGE UP (ref 0–0)
PLATELET # BLD AUTO: 318 K/UL — SIGNIFICANT CHANGE UP (ref 150–400)
POTASSIUM SERPL-MCNC: 3.6 MMOL/L — SIGNIFICANT CHANGE UP (ref 3.5–5.3)
POTASSIUM SERPL-SCNC: 3.6 MMOL/L — SIGNIFICANT CHANGE UP (ref 3.5–5.3)
PROTHROM AB SERPL-ACNC: 12.6 SEC — SIGNIFICANT CHANGE UP (ref 10.5–13.4)
RBC # BLD: 4.68 M/UL — SIGNIFICANT CHANGE UP (ref 3.8–5.2)
RBC # FLD: 17.1 % — HIGH (ref 10.3–14.5)
SODIUM SERPL-SCNC: 146 MMOL/L — HIGH (ref 135–145)
SPECIMEN SOURCE: SIGNIFICANT CHANGE UP
WBC # BLD: 7.35 K/UL — SIGNIFICANT CHANGE UP (ref 3.8–10.5)
WBC # FLD AUTO: 7.35 K/UL — SIGNIFICANT CHANGE UP (ref 3.8–10.5)

## 2022-06-05 PROCEDURE — 99233 SBSQ HOSP IP/OBS HIGH 50: CPT

## 2022-06-05 PROCEDURE — 49653: CPT

## 2022-06-05 RX ORDER — SERTRALINE 25 MG/1
100 TABLET, FILM COATED ORAL DAILY
Refills: 0 | Status: DISCONTINUED | OUTPATIENT
Start: 2022-06-05 | End: 2022-06-08

## 2022-06-05 RX ORDER — HYDROMORPHONE HYDROCHLORIDE 2 MG/ML
1 INJECTION INTRAMUSCULAR; INTRAVENOUS; SUBCUTANEOUS
Refills: 0 | Status: DISCONTINUED | OUTPATIENT
Start: 2022-06-05 | End: 2022-06-05

## 2022-06-05 RX ORDER — INSULIN GLARGINE 100 [IU]/ML
15 INJECTION, SOLUTION SUBCUTANEOUS AT BEDTIME
Refills: 0 | Status: DISCONTINUED | OUTPATIENT
Start: 2022-06-05 | End: 2022-06-06

## 2022-06-05 RX ORDER — SODIUM CHLORIDE 9 MG/ML
1000 INJECTION, SOLUTION INTRAVENOUS
Refills: 0 | Status: DISCONTINUED | OUTPATIENT
Start: 2022-06-05 | End: 2022-06-05

## 2022-06-05 RX ORDER — HYDROMORPHONE HYDROCHLORIDE 2 MG/ML
0.5 INJECTION INTRAMUSCULAR; INTRAVENOUS; SUBCUTANEOUS
Refills: 0 | Status: DISCONTINUED | OUTPATIENT
Start: 2022-06-05 | End: 2022-06-05

## 2022-06-05 RX ORDER — ENOXAPARIN SODIUM 100 MG/ML
40 INJECTION SUBCUTANEOUS EVERY 12 HOURS
Refills: 0 | Status: DISCONTINUED | OUTPATIENT
Start: 2022-06-05 | End: 2022-06-08

## 2022-06-05 RX ORDER — KETOROLAC TROMETHAMINE 30 MG/ML
15 SYRINGE (ML) INJECTION ONCE
Refills: 0 | Status: DISCONTINUED | OUTPATIENT
Start: 2022-06-05 | End: 2022-06-08

## 2022-06-05 RX ORDER — INSULIN LISPRO 100/ML
1 VIAL (ML) SUBCUTANEOUS ONCE
Refills: 0 | Status: COMPLETED | OUTPATIENT
Start: 2022-06-05 | End: 2022-06-05

## 2022-06-05 RX ORDER — ACETAMINOPHEN 500 MG
1000 TABLET ORAL ONCE
Refills: 0 | Status: COMPLETED | OUTPATIENT
Start: 2022-06-05 | End: 2022-06-05

## 2022-06-05 RX ORDER — ACETAMINOPHEN 500 MG
650 TABLET ORAL EVERY 6 HOURS
Refills: 0 | Status: DISCONTINUED | OUTPATIENT
Start: 2022-06-05 | End: 2022-06-08

## 2022-06-05 RX ORDER — HYDROMORPHONE HYDROCHLORIDE 2 MG/ML
0.5 INJECTION INTRAMUSCULAR; INTRAVENOUS; SUBCUTANEOUS
Refills: 0 | Status: DISCONTINUED | OUTPATIENT
Start: 2022-06-05 | End: 2022-06-08

## 2022-06-05 RX ORDER — SODIUM CHLORIDE 9 MG/ML
1000 INJECTION INTRAMUSCULAR; INTRAVENOUS; SUBCUTANEOUS
Refills: 0 | Status: DISCONTINUED | OUTPATIENT
Start: 2022-06-05 | End: 2022-06-06

## 2022-06-05 RX ADMIN — Medication 1000 MILLIGRAM(S): at 13:33

## 2022-06-05 RX ADMIN — HYDROMORPHONE HYDROCHLORIDE 0.5 MILLIGRAM(S): 2 INJECTION INTRAMUSCULAR; INTRAVENOUS; SUBCUTANEOUS at 13:16

## 2022-06-05 RX ADMIN — Medication 1 UNIT(S): at 06:04

## 2022-06-05 RX ADMIN — HYDROMORPHONE HYDROCHLORIDE 0.5 MILLIGRAM(S): 2 INJECTION INTRAMUSCULAR; INTRAVENOUS; SUBCUTANEOUS at 22:08

## 2022-06-05 RX ADMIN — SODIUM CHLORIDE 130 MILLILITER(S): 9 INJECTION, SOLUTION INTRAVENOUS at 05:37

## 2022-06-05 RX ADMIN — ENOXAPARIN SODIUM 40 MILLIGRAM(S): 100 INJECTION SUBCUTANEOUS at 22:08

## 2022-06-05 RX ADMIN — Medication 650 MILLIGRAM(S): at 17:31

## 2022-06-05 RX ADMIN — CHLORHEXIDINE GLUCONATE 1 APPLICATION(S): 213 SOLUTION TOPICAL at 10:21

## 2022-06-05 RX ADMIN — Medication 650 MILLIGRAM(S): at 18:01

## 2022-06-05 RX ADMIN — SERTRALINE 100 MILLIGRAM(S): 25 TABLET, FILM COATED ORAL at 17:31

## 2022-06-05 RX ADMIN — Medication 650 MILLIGRAM(S): at 23:22

## 2022-06-05 RX ADMIN — Medication 400 MILLIGRAM(S): at 13:17

## 2022-06-05 RX ADMIN — INSULIN GLARGINE 15 UNIT(S): 100 INJECTION, SOLUTION SUBCUTANEOUS at 22:58

## 2022-06-05 RX ADMIN — HYDROMORPHONE HYDROCHLORIDE 0.5 MILLIGRAM(S): 2 INJECTION INTRAMUSCULAR; INTRAVENOUS; SUBCUTANEOUS at 13:33

## 2022-06-05 RX ADMIN — Medication 4: at 22:58

## 2022-06-05 RX ADMIN — HYDROMORPHONE HYDROCHLORIDE 0.5 MILLIGRAM(S): 2 INJECTION INTRAMUSCULAR; INTRAVENOUS; SUBCUTANEOUS at 22:48

## 2022-06-05 NOTE — DIETITIAN INITIAL EVALUATION ADULT - OTHER INFO
Patient from home admitted for abdominal pain x5days. Visited pt. in the am, alert & awake, reports of "still with abdominal pain" & "not eating for months only drinking orange juices, also was told not good for her"? At times pt. "difficult to engaged", presently NPO with IV fluids, per RN, pt. schedule for OR today laparoscopic hernia repair with mesh placement, Surgery/team following.

## 2022-06-05 NOTE — BRIEF OPERATIVE NOTE - NSICDXBRIEFPOSTOP_GEN_ALL_CORE_FT
POST-OP DIAGNOSIS:  Incarcerated incisional hernia 05-Jun-2022 13:05:04  Aure Mtz  Incisional hernia 05-Jun-2022 13:05:10  Aure Mtz

## 2022-06-05 NOTE — BRIEF OPERATIVE NOTE - NSICDXBRIEFPREOP_GEN_ALL_CORE_FT
PRE-OP DIAGNOSIS:  Incarcerated incisional hernia 05-Jun-2022 13:04:52  Aure Mtz  Incisional hernia 05-Jun-2022 13:04:46  Aure Mtz

## 2022-06-05 NOTE — CHART NOTE - NSCHARTNOTEFT_GEN_A_CORE
Pt POD 0 s/p Laparoscopic repair, incisional hernia   no n/v  upton clear UO  comfortable    Vital Signs Last 24 Hrs  T(C): 36.6 (05 Jun 2022 14:55), Max: 37.2 (04 Jun 2022 20:32)  T(F): 97.9 (05 Jun 2022 14:55), Max: 99 (04 Jun 2022 20:32)  HR: 100 (05 Jun 2022 14:55) (86 - 100)  BP: 115/73 (05 Jun 2022 14:55) (103/56 - 140/89)  BP(mean): 86 (05 Jun 2022 13:33) (67 - 104)  RR: 18 (05 Jun 2022 14:55) (15 - 22)  SpO2: 94% (05 Jun 2022 14:55) (94% - 99%)    abd soft, inc CDI, NT    stable post-op

## 2022-06-05 NOTE — DIETITIAN INITIAL EVALUATION ADULT - DIET TYPE
rec, once diet advance/consistent carbohydrate (no snacks)/DASH/TLC (sodium and cholesterol restricted diet)/easy to chew

## 2022-06-05 NOTE — PROGRESS NOTE ADULT - ASSESSMENT
Patient is a 68 yo. F with PMH DM, DKAx2 in 2014,l eft leg  DVT (was on Xarelto), HLD and Depression and PSH of abdominal hernia repair (2005) with c/o sharp lower abdominal pain. Admitted to surgery service for SBO 2/2 incarcerated inguinal hernia. Admitted for SBO 2/2 incarcerated hernia s/p laparoscopic hernia repair with mesh on 6/5 , medicine consulted for pre-op risk stratification and DM management    A/P:  #S/p laparoscopic hernia repair 5/6   #SBO 2/2 incarcerated hernia    #Uncontrolled DM   #Depression  #HLD    Plan:  -Pt is s/p lap hernia repair with mesh, POD #0 tolerated procedure well. On clear liquid diet, care per surgery.   -Pt with hx of DM, Hba1C 12.8 depictive of poor control. On glipizide metformin, Trulicity and Lantus at home per records, likely non-complaint with medications. Agree with Lantus 15 units, start HSS. Monitor blood sugars on sliding scale, will likely need pre-meals.       Patient is a 66 yo. F with PMH DM, DKAx2 in 2014,l eft leg  DVT (was on Xarelto), HLD and Depression and PSH of abdominal hernia repair (2005) with c/o sharp lower abdominal pain. Admitted to surgery service for SBO 2/2 incarcerated inguinal hernia. Admitted for SBO 2/2 incarcerated hernia s/p laparoscopic hernia repair with mesh on 6/5 , medicine consulted for pre-op risk stratification and DM management    A/P:  #S/p laparoscopic hernia repair 5/6   #SBO 2/2 incarcerated hernia    #Uncontrolled DM   #Depression  #HLD    Plan:  -Pt is s/p lap hernia repair with mesh, POD #0 tolerated procedure well. On clear liquid diet, care per surgery.   -Pt with hx of DM, Hba1C 12.8 depictive of poor control. On glipizide metformin, Trulicity and Lantus at home per records, likely non-complaint with medications. Agree with Lantus 15 units, start HSS. Monitor blood sugars on sliding scale, will likely need pre-meals.

## 2022-06-05 NOTE — DIETITIAN INITIAL EVALUATION ADULT - PERTINENT LABORATORY DATA
06-05    146<H>  |  115<H>  |  10  ----------------------------<  234<H>  3.6   |  22  |  0.88    Ca    8.4      05 Jun 2022 06:10    TPro  7.0  /  Alb  2.8<L>  /  TBili  0.5  /  DBili  x   /  AST  11  /  ALT  14  /  AlkPhos  78  06-04  POCT Blood Glucose.: 224 mg/dL (06-05-22 @ 05:36)

## 2022-06-05 NOTE — BRIEF OPERATIVE NOTE - OPERATION/FINDINGS
Patient with incarcerated incisional ventral hernia. Previous mesh identified, bowel loops adhered to it. Hernia defect with incarcerated bowel, with hernia ring of about 3cm.   Performed laparoscopic repair with mesh. Surgical tac used. Patient with incarcerated incisional ventral hernia. Previous mesh identified, bowel loops adhered to it. Hernia defect with incarcerated bowel, with hernia ring of about 2 cm. No sings of bowel necrosis, perforation or peritonitis.  Performed laparoscopic repair with mesh. Surgical tac used.

## 2022-06-05 NOTE — PROGRESS NOTE ADULT - SUBJECTIVE AND OBJECTIVE BOX
Patient is a 67y old  Female who presents with a chief complaint of Intestinal obstruction     (2022 10:21)      INTERVAL HPI/OVERNIGHT EVENTS: no events noted overnight.    MEDICATIONS  (STANDING):  acetaminophen     Tablet .. 650 milliGRAM(s) Oral every 6 hours  chlorhexidine 2% Cloths 1 Application(s) Topical daily  dextrose 5% + sodium chloride 0.9%. 1000 milliLiter(s) (130 mL/Hr) IV Continuous <Continuous>  dextrose 5%. 1000 milliLiter(s) (100 mL/Hr) IV Continuous <Continuous>  dextrose 5%. 1000 milliLiter(s) (50 mL/Hr) IV Continuous <Continuous>  dextrose 50% Injectable 25 Gram(s) IV Push once  dextrose 50% Injectable 12.5 Gram(s) IV Push once  dextrose 50% Injectable 25 Gram(s) IV Push once  enoxaparin Injectable 40 milliGRAM(s) SubCutaneous every 12 hours  glucagon  Injectable 1 milliGRAM(s) IntraMuscular once  insulin glargine Injectable (LANTUS) 15 Unit(s) SubCutaneous at bedtime  insulin glargine Injectable (LANTUS) 10 Unit(s) SubCutaneous every morning  insulin lispro (ADMELOG) corrective regimen sliding scale   SubCutaneous at bedtime  ketorolac   Injectable 15 milliGRAM(s) IV Push once  sertraline 100 milliGRAM(s) Oral daily  sodium chloride 0.9%. 1000 milliLiter(s) (100 mL/Hr) IV Continuous <Continuous>    MEDICATIONS  (PRN):  dextrose Oral Gel 15 Gram(s) Oral once PRN Blood Glucose LESS THAN 70 milliGRAM(s)/deciliter  HYDROmorphone  Injectable 0.5 milliGRAM(s) IV Push every 3 hours PRN Severe Pain (7 - 10)  ondansetron Injectable 4 milliGRAM(s) IV Push every 6 hours PRN Nausea      __________________________________________________  REVIEW OF SYSTEMS:    CONSTITUTIONAL: No fever,   EYES: no acute visual disturbances  NECK: No pain or stiffness  RESPIRATORY: No cough; No shortness of breath  CARDIOVASCULAR: No chest pain, no palpitations  GASTROINTESTINAL: No pain. No nausea or vomiting; No diarrhea   NEUROLOGICAL: No headache or numbness, no tremors  MUSCULOSKELETAL: No joint pain, no muscle pain  GENITOURINARY: no dysuria, no frequency, no hesitancy  PSYCHIATRY: no depression , no anxiety  ALL OTHER  ROS negative        Vital Signs Last 24 Hrs  T(C): 36.6 (2022 14:55), Max: 37.2 (2022 20:32)  T(F): 97.9 (2022 14:55), Max: 99 (2022 20:32)  HR: 100 (2022 14:55) (86 - 100)  BP: 115/73 (2022 14:55) (103/56 - 140/89)  BP(mean): 86 (2022 13:33) (67 - 104)  RR: 18 (2022 14:55) (15 - 22)  SpO2: 94% (2022 14:55) (94% - 99%)    ________________________________________________  PHYSICAL EXAM:  GENERAL: NAD  HEENT: Normocephalic;  conjunctivae and sclerae clear; moist mucous membranes;   NECK : supple  CHEST/LUNG: Clear to auscultation bilaterally with good air entry   HEART: S1 S2  regular; no murmurs, gallops or rubs  ABDOMEN: Soft, Nontender, Nondistended; Bowel sounds present  EXTREMITIES: no cyanosis; no edema; no calf tenderness  SKIN: warm and dry; no rash  NERVOUS SYSTEM:  Awake and alert; Oriented  to place, person and time ; no new deficits    _________________________________________________  LABS:                        11.8   7.35  )-----------( 318      ( 2022 06:10 )             36.4     06-05    146<H>  |  115<H>  |  10  ----------------------------<  234<H>  3.6   |  22  |  0.88    Ca    8.4      2022 06:10    TPro  7.0  /  Alb  2.8<L>  /  TBili  0.5  /  DBili  x   /  AST  11  /  ALT  14  /  AlkPhos  78  06-04    PT/INR - ( 2022 06:10 )   PT: 12.6 sec;   INR: 1.06 ratio         PTT - ( 2022 06:10 )  PTT:29.6 sec  Urinalysis Basic - ( 2022 09:47 )    Color: Yellow / Appearance: Slightly Turbid / S.025 / pH: x  Gluc: x / Ketone: Negative  / Bili: Negative / Urobili: Negative   Blood: x / Protein: Negative / Nitrite: Negative   Leuk Esterase: Small / RBC: 0-2 /HPF / WBC 11-25 /HPF   Sq Epi: x / Non Sq Epi: Moderate /HPF / Bacteria: Moderate /HPF      CAPILLARY BLOOD GLUCOSE      POCT Blood Glucose.: 207 mg/dL (2022 12:57)  POCT Blood Glucose.: 224 mg/dL (2022 05:36)        RADIOLOGY & ADDITIONAL TESTS:    Imaging Personally Reviewed:  YES    Consultant(s) Notes Reviewed:   YES    Care Discussed with Consultants : YES     Plan of care was discussed with patient and /or primary care giver; all questions and concerns were addressed and care was aligned with patient's wishes.       Patient is a 67y old  Female who presents with a chief complaint of Intestinal obstruction     (2022 10:21)      INTERVAL HPI/OVERNIGHT EVENTS: Patient is s/p laparoscopic repair with mesh this am, she tolerated procedure.     MEDICATIONS  (STANDING):  acetaminophen     Tablet .. 650 milliGRAM(s) Oral every 6 hours  chlorhexidine 2% Cloths 1 Application(s) Topical daily  dextrose 5% + sodium chloride 0.9%. 1000 milliLiter(s) (130 mL/Hr) IV Continuous <Continuous>  dextrose 5%. 1000 milliLiter(s) (100 mL/Hr) IV Continuous <Continuous>  dextrose 5%. 1000 milliLiter(s) (50 mL/Hr) IV Continuous <Continuous>  dextrose 50% Injectable 25 Gram(s) IV Push once  dextrose 50% Injectable 12.5 Gram(s) IV Push once  dextrose 50% Injectable 25 Gram(s) IV Push once  enoxaparin Injectable 40 milliGRAM(s) SubCutaneous every 12 hours  glucagon  Injectable 1 milliGRAM(s) IntraMuscular once  insulin glargine Injectable (LANTUS) 15 Unit(s) SubCutaneous at bedtime  insulin glargine Injectable (LANTUS) 10 Unit(s) SubCutaneous every morning  insulin lispro (ADMELOG) corrective regimen sliding scale   SubCutaneous at bedtime  ketorolac   Injectable 15 milliGRAM(s) IV Push once  sertraline 100 milliGRAM(s) Oral daily  sodium chloride 0.9%. 1000 milliLiter(s) (100 mL/Hr) IV Continuous <Continuous>    MEDICATIONS  (PRN):  dextrose Oral Gel 15 Gram(s) Oral once PRN Blood Glucose LESS THAN 70 milliGRAM(s)/deciliter  HYDROmorphone  Injectable 0.5 milliGRAM(s) IV Push every 3 hours PRN Severe Pain (7 - 10)  ondansetron Injectable 4 milliGRAM(s) IV Push every 6 hours PRN Nausea      __________________________________________________  REVIEW OF SYSTEMS:    CONSTITUTIONAL: No fever,   EYES: no acute visual disturbances  NECK: No pain or stiffness  RESPIRATORY: No cough; No shortness of breath  CARDIOVASCULAR: No chest pain, no palpitations  GASTROINTESTINAL: No pain. No nausea or vomiting; No diarrhea   NEUROLOGICAL: No headache or numbness, no tremors  MUSCULOSKELETAL: No joint pain, no muscle pain  GENITOURINARY: no dysuria, no frequency, no hesitancy  PSYCHIATRY: no depression , no anxiety  ALL OTHER  ROS negative        Vital Signs Last 24 Hrs  T(C): 36.6 (2022 14:55), Max: 37.2 (2022 20:32)  T(F): 97.9 (2022 14:55), Max: 99 (2022 20:32)  HR: 100 (2022 14:55) (86 - 100)  BP: 115/73 (2022 14:55) (103/56 - 140/89)  BP(mean): 86 (2022 13:33) (67 - 104)  RR: 18 (2022 14:55) (15 - 22)  SpO2: 94% (2022 14:55) (94% - 99%)    ________________________________________________  PHYSICAL EXAM:  GENERAL: NAD  HEENT: Normocephalic;  conjunctivae and sclerae clear; moist mucous membranes; small aphthous ulcer on the left lateral aspect of tongue, mild swelling of the left sided upper lip, poor dental hygiene missing teeth   NECK : supple  CHEST/LUNG: Clear to auscultation bilaterally with good air entry   HEART: S1 S2  regular; no murmurs, gallops or rubs  ABDOMEN: Soft, Nontender, distended, incision site c/d/i   EXTREMITIES: no cyanosis; no edema; no calf tenderness  SKIN: warm and dry; no rash  NERVOUS SYSTEM:  Awake and alert; Oriented  to place, person and time ; no new deficits    _________________________________________________  LABS:                        11.8   7.35  )-----------( 318      ( 2022 06:10 )             36.4     06-05    146<H>  |  115<H>  |  10  ----------------------------<  234<H>  3.6   |  22  |  0.88    Ca    8.4      2022 06:10    TPro  7.0  /  Alb  2.8<L>  /  TBili  0.5  /  DBili  x   /  AST  11  /  ALT  14  /  AlkPhos  78  06-04    PT/INR - ( 2022 06:10 )   PT: 12.6 sec;   INR: 1.06 ratio         PTT - ( 2022 06:10 )  PTT:29.6 sec  Urinalysis Basic - ( 2022 09:47 )    Color: Yellow / Appearance: Slightly Turbid / S.025 / pH: x  Gluc: x / Ketone: Negative  / Bili: Negative / Urobili: Negative   Blood: x / Protein: Negative / Nitrite: Negative   Leuk Esterase: Small / RBC: 0-2 /HPF / WBC 11-25 /HPF   Sq Epi: x / Non Sq Epi: Moderate /HPF / Bacteria: Moderate /HPF      CAPILLARY BLOOD GLUCOSE      POCT Blood Glucose.: 207 mg/dL (2022 12:57)  POCT Blood Glucose.: 224 mg/dL (2022 05:36)        RADIOLOGY & ADDITIONAL TESTS:    Imaging Personally Reviewed:  YES    Consultant(s) Notes Reviewed:   YES    Care Discussed with Consultants : YES     Plan of care was discussed with patient and /or primary care giver; all questions and concerns were addressed and care was aligned with patient's wishes.

## 2022-06-05 NOTE — DIETITIAN INITIAL EVALUATION ADULT - PERTINENT MEDS FT
MEDICATIONS  (STANDING):  chlorhexidine 2% Cloths 1 Application(s) Topical daily  dextrose 5% + sodium chloride 0.9%. 1000 milliLiter(s) (130 mL/Hr) IV Continuous <Continuous>  dextrose 5%. 1000 milliLiter(s) (100 mL/Hr) IV Continuous <Continuous>  dextrose 5%. 1000 milliLiter(s) (50 mL/Hr) IV Continuous <Continuous>  dextrose 50% Injectable 25 Gram(s) IV Push once  dextrose 50% Injectable 12.5 Gram(s) IV Push once  dextrose 50% Injectable 25 Gram(s) IV Push once  glucagon  Injectable 1 milliGRAM(s) IntraMuscular once  insulin glargine Injectable (LANTUS) 10 Unit(s) SubCutaneous every morning  insulin lispro (ADMELOG) corrective regimen sliding scale   SubCutaneous at bedtime    MEDICATIONS  (PRN):  dextrose Oral Gel 15 Gram(s) Oral once PRN Blood Glucose LESS THAN 70 milliGRAM(s)/deciliter  ondansetron Injectable 4 milliGRAM(s) IV Push every 6 hours PRN Nausea

## 2022-06-06 DIAGNOSIS — E11.65 TYPE 2 DIABETES MELLITUS WITH HYPERGLYCEMIA: ICD-10-CM

## 2022-06-06 LAB
ANION GAP SERPL CALC-SCNC: 8 MMOL/L — SIGNIFICANT CHANGE UP (ref 5–17)
BUN SERPL-MCNC: 8 MG/DL — SIGNIFICANT CHANGE UP (ref 7–18)
CALCIUM SERPL-MCNC: 7.6 MG/DL — LOW (ref 8.4–10.5)
CHLORIDE SERPL-SCNC: 114 MMOL/L — HIGH (ref 96–108)
CO2 SERPL-SCNC: 21 MMOL/L — LOW (ref 22–31)
CREAT SERPL-MCNC: 0.75 MG/DL — SIGNIFICANT CHANGE UP (ref 0.5–1.3)
EGFR: 87 ML/MIN/1.73M2 — SIGNIFICANT CHANGE UP
GLUCOSE BLDC GLUCOMTR-MCNC: 149 MG/DL — HIGH (ref 70–99)
GLUCOSE BLDC GLUCOMTR-MCNC: 149 MG/DL — HIGH (ref 70–99)
GLUCOSE BLDC GLUCOMTR-MCNC: 220 MG/DL — HIGH (ref 70–99)
GLUCOSE BLDC GLUCOMTR-MCNC: 234 MG/DL — HIGH (ref 70–99)
GLUCOSE SERPL-MCNC: 160 MG/DL — HIGH (ref 70–99)
HCT VFR BLD CALC: 36.3 % — SIGNIFICANT CHANGE UP (ref 34.5–45)
HGB BLD-MCNC: 11.9 G/DL — SIGNIFICANT CHANGE UP (ref 11.5–15.5)
MAGNESIUM SERPL-MCNC: 1.3 MG/DL — LOW (ref 1.6–2.6)
MCHC RBC-ENTMCNC: 25.7 PG — LOW (ref 27–34)
MCHC RBC-ENTMCNC: 32.8 GM/DL — SIGNIFICANT CHANGE UP (ref 32–36)
MCV RBC AUTO: 78.4 FL — LOW (ref 80–100)
NRBC # BLD: 0 /100 WBCS — SIGNIFICANT CHANGE UP (ref 0–0)
PHOSPHATE SERPL-MCNC: 2.5 MG/DL — SIGNIFICANT CHANGE UP (ref 2.5–4.5)
PLATELET # BLD AUTO: 314 K/UL — SIGNIFICANT CHANGE UP (ref 150–400)
POTASSIUM SERPL-MCNC: 3.6 MMOL/L — SIGNIFICANT CHANGE UP (ref 3.5–5.3)
POTASSIUM SERPL-SCNC: 3.6 MMOL/L — SIGNIFICANT CHANGE UP (ref 3.5–5.3)
RBC # BLD: 4.63 M/UL — SIGNIFICANT CHANGE UP (ref 3.8–5.2)
RBC # FLD: 16.8 % — HIGH (ref 10.3–14.5)
SODIUM SERPL-SCNC: 143 MMOL/L — SIGNIFICANT CHANGE UP (ref 135–145)
WBC # BLD: 8.57 K/UL — SIGNIFICANT CHANGE UP (ref 3.8–10.5)
WBC # FLD AUTO: 8.57 K/UL — SIGNIFICANT CHANGE UP (ref 3.8–10.5)

## 2022-06-06 PROCEDURE — 99233 SBSQ HOSP IP/OBS HIGH 50: CPT

## 2022-06-06 RX ORDER — MAGNESIUM SULFATE 500 MG/ML
1 VIAL (ML) INJECTION ONCE
Refills: 0 | Status: DISCONTINUED | OUTPATIENT
Start: 2022-06-06 | End: 2022-06-06

## 2022-06-06 RX ORDER — INSULIN LISPRO 100/ML
VIAL (ML) SUBCUTANEOUS AT BEDTIME
Refills: 0 | Status: DISCONTINUED | OUTPATIENT
Start: 2022-06-06 | End: 2022-06-08

## 2022-06-06 RX ORDER — POTASSIUM CHLORIDE 20 MEQ
40 PACKET (EA) ORAL ONCE
Refills: 0 | Status: DISCONTINUED | OUTPATIENT
Start: 2022-06-06 | End: 2022-06-06

## 2022-06-06 RX ORDER — INSULIN GLARGINE 100 [IU]/ML
24 INJECTION, SOLUTION SUBCUTANEOUS AT BEDTIME
Refills: 0 | Status: DISCONTINUED | OUTPATIENT
Start: 2022-06-06 | End: 2022-06-08

## 2022-06-06 RX ORDER — POTASSIUM CHLORIDE 20 MEQ
20 PACKET (EA) ORAL
Refills: 0 | Status: COMPLETED | OUTPATIENT
Start: 2022-06-06 | End: 2022-06-06

## 2022-06-06 RX ORDER — POTASSIUM PHOSPHATE, MONOBASIC POTASSIUM PHOSPHATE, DIBASIC 236; 224 MG/ML; MG/ML
15 INJECTION, SOLUTION INTRAVENOUS ONCE
Refills: 0 | Status: COMPLETED | OUTPATIENT
Start: 2022-06-06 | End: 2022-06-06

## 2022-06-06 RX ORDER — PANTOPRAZOLE SODIUM 20 MG/1
40 TABLET, DELAYED RELEASE ORAL ONCE
Refills: 0 | Status: COMPLETED | OUTPATIENT
Start: 2022-06-06 | End: 2022-06-06

## 2022-06-06 RX ORDER — MAGNESIUM OXIDE 400 MG ORAL TABLET 241.3 MG
800 TABLET ORAL ONCE
Refills: 0 | Status: COMPLETED | OUTPATIENT
Start: 2022-06-06 | End: 2022-06-06

## 2022-06-06 RX ORDER — INSULIN LISPRO 100/ML
VIAL (ML) SUBCUTANEOUS
Refills: 0 | Status: DISCONTINUED | OUTPATIENT
Start: 2022-06-06 | End: 2022-06-08

## 2022-06-06 RX ORDER — PANTOPRAZOLE SODIUM 20 MG/1
40 TABLET, DELAYED RELEASE ORAL
Refills: 0 | Status: DISCONTINUED | OUTPATIENT
Start: 2022-06-06 | End: 2022-06-08

## 2022-06-06 RX ADMIN — CHLORHEXIDINE GLUCONATE 1 APPLICATION(S): 213 SOLUTION TOPICAL at 11:58

## 2022-06-06 RX ADMIN — SODIUM CHLORIDE 130 MILLILITER(S): 9 INJECTION, SOLUTION INTRAVENOUS at 00:42

## 2022-06-06 RX ADMIN — ENOXAPARIN SODIUM 40 MILLIGRAM(S): 100 INJECTION SUBCUTANEOUS at 21:32

## 2022-06-06 RX ADMIN — Medication 650 MILLIGRAM(S): at 18:20

## 2022-06-06 RX ADMIN — Medication 20 MILLIEQUIVALENT(S): at 14:23

## 2022-06-06 RX ADMIN — Medication 4: at 16:56

## 2022-06-06 RX ADMIN — HYDROMORPHONE HYDROCHLORIDE 0.5 MILLIGRAM(S): 2 INJECTION INTRAMUSCULAR; INTRAVENOUS; SUBCUTANEOUS at 03:06

## 2022-06-06 RX ADMIN — SERTRALINE 100 MILLIGRAM(S): 25 TABLET, FILM COATED ORAL at 14:24

## 2022-06-06 RX ADMIN — HYDROMORPHONE HYDROCHLORIDE 0.5 MILLIGRAM(S): 2 INJECTION INTRAMUSCULAR; INTRAVENOUS; SUBCUTANEOUS at 12:10

## 2022-06-06 RX ADMIN — POTASSIUM PHOSPHATE, MONOBASIC POTASSIUM PHOSPHATE, DIBASIC 62.5 MILLIMOLE(S): 236; 224 INJECTION, SOLUTION INTRAVENOUS at 16:57

## 2022-06-06 RX ADMIN — INSULIN GLARGINE 24 UNIT(S): 100 INJECTION, SOLUTION SUBCUTANEOUS at 21:33

## 2022-06-06 RX ADMIN — PANTOPRAZOLE SODIUM 40 MILLIGRAM(S): 20 TABLET, DELAYED RELEASE ORAL at 08:21

## 2022-06-06 RX ADMIN — Medication 650 MILLIGRAM(S): at 06:09

## 2022-06-06 RX ADMIN — MAGNESIUM OXIDE 400 MG ORAL TABLET 800 MILLIGRAM(S): 241.3 TABLET ORAL at 14:23

## 2022-06-06 RX ADMIN — Medication 650 MILLIGRAM(S): at 00:00

## 2022-06-06 RX ADMIN — ENOXAPARIN SODIUM 40 MILLIGRAM(S): 100 INJECTION SUBCUTANEOUS at 08:22

## 2022-06-06 RX ADMIN — Medication 650 MILLIGRAM(S): at 23:41

## 2022-06-06 RX ADMIN — Medication 650 MILLIGRAM(S): at 11:59

## 2022-06-06 RX ADMIN — Medication 650 MILLIGRAM(S): at 17:23

## 2022-06-06 RX ADMIN — HYDROMORPHONE HYDROCHLORIDE 0.5 MILLIGRAM(S): 2 INJECTION INTRAMUSCULAR; INTRAVENOUS; SUBCUTANEOUS at 11:55

## 2022-06-06 RX ADMIN — Medication 650 MILLIGRAM(S): at 06:30

## 2022-06-06 RX ADMIN — HYDROMORPHONE HYDROCHLORIDE 0.5 MILLIGRAM(S): 2 INJECTION INTRAMUSCULAR; INTRAVENOUS; SUBCUTANEOUS at 03:21

## 2022-06-06 RX ADMIN — Medication 650 MILLIGRAM(S): at 13:00

## 2022-06-06 RX ADMIN — Medication 20 MILLIEQUIVALENT(S): at 16:57

## 2022-06-06 NOTE — CONSULT NOTE ADULT - SUBJECTIVE AND OBJECTIVE BOX
JA QUINONES  67y  Female      68 yo F with PMH DM, DKAx2 in 2014,left leg  DVT, HLD and Depression and PSH of abdominal hernia repair () presents to [facility] ER c/o abd pain for 5 days. Pt states at around 3AM Monday morning she had sharp pain in lower abd. Associated with nausea and vomiting. Last BM Wednesday, small amount. Passing flatus yesterday and today. Pt not very aware of lower abd distention, states she guess she must have had it for a long time. Denies fever, chills, sudden increase in intra abdominal pressure i.e. excessive bending, hard sneeze/cough. In , pt had elective surgery for incisional hernia repair of  incision. Pt recalls mesh was used. Pt states she returned to the hospital the next day with pain and needing surgery again. Pt currently resting comfortably in bed.      PAST MEDICAL/SURGICAL HISTORY  PAST MEDICAL & SURGICAL HISTORY:  Diabetes      Hyperlipidemia      Depression      Abdominal hernia          T(C): 36.8 (22 @ 23:01), Max: 36.8 (22 @ 23:01)  HR: 76 (22 @ 23:01) (76 - 76)  BP: 147/88 (22 @ 23:01) (147/88 - 147/88)  RR: 18 (22 @ 23:01) (18 - 18)  SpO2: 98% (22 @ 23:01) (98% - 98%)  Wt(kg): --Vital Signs Last 24 Hrs  T(C): 36.8 (2022 23:01), Max: 36.8 (2022 23:01)  T(F): 98.2 (2022 23:01), Max: 98.2 (2022 23:01)  HR: 76 (2022 23:01) (76 - 76)  BP: 147/88 (2022 23:01) (147/88 - 147/88)  BP(mean): --  RR: 18 (2022 23:01) (18 - 18)  SpO2: 98% (2022 23:01) (98% - 98%)    PHYSICAL EXAM:  GENERAL: unkempt , disheveled, Obese   HEAD:  Atraumatic, Normocephalic  EYES: EOMI, PERRLA, conjunctiva and sclera clear  ENMT: No tonsillar erythema, exudates, or enlargement; Moist mucous membranes, Good dentition, No lesions  NECK: Supple, No JVD, Normal thyroid  NERVOUS SYSTEM:  Alert & Oriented X3, Good concentration; Motor Strength 5/5 B/L upper and lower extremities; DTRs 2+ intact and symmetric  CHEST/LUNG: Clear to percussion bilaterally; No rales, rhonchi, wheezing, or rubs  HEART: Regular rate and rhythm; No murmurs, rubs, or gallops  ABDOMEN: Soft,  diffuse tenderness, distended   EXTREMITIES:  2+ Peripheral Pulses, No clubbing, cyanosis, or edema  LYMPH: No lymphadenopathy noted  SKIN: No rashes or lesions    Consultant(s) Notes Reviewed:  [x ] YES  [ ] NO  Care Discussed with Consultants/Other Providers [ x] YES  [ ] NO    LABS:  CBC   22 @ 00:13  Hematcorit 37.9  Hemoglobin 12.8  Mean Cell Hemoglobin 25.8  Platelet Count-Automated 355  RBC Count 4.97  Red Cell Distrib Width 16.5  Wbc Count 10.37      BMP  22 @ 00:13  Anion Gap. Serum 9  Blood Urea Nitrogen,Serm 15  Calcium, Total Serum 9.6  Carbon Dioxide, Serum 25  Chloride, Serum 106  Creatinine, Serum 0.85  eGFR in  --  eGFR in Non Afican American --  Gloucose, serum 245  Potassium, Serum 3.7  Sodium, Serum 140                  CMP  22 @ 00:13  Emi Aminotransferase(ALT/SGPT)14  Albumin, Serum 2.8  Alkaline Phosphatase, Serum 78  Anion Gap, Serum 9  Aspartate Aminotransferase (AST/SGOT)11  Bilirubin Total, Serum 0.5  Blood Urea Nitrogen, Serum 15  Calcium,Total Serum 9.6  Carbon Dioxide, Serum 25  Chloride, Serum 106  Creatinine, Serum 0.85  eGFR if  --  eGFR if Non African American --  Glucose, Serum 245  Potassium, Serum 3.7  Protein Total, Serum 7.0  Sodium, Serum 140                          PT/INR      Amylase/Lipase  22 @ 00:13  Amylase, Serum Total --  Lipase, Serum 123            RADIOLOGY & ADDITIONAL TESTS:    Imaging Personally Reviewed:  [ ] YES  [ ] NO
Patient is a 67y old  Female who presents with a chief complaint of SBO (2022 10:22)      HPI:  67 y.o. F with PMH DM, DKAx2 in ,left leg  DVT, HLD and Depression and PSH of abdominal hernia repair () presents to [facility] ER c/o abd pain for 5 days. Pt states at around 3AM Monday morning she had sharp pain in lower abd. Associated with nausea and vomiting. Last BM Wednesday, small amount. Passing flatus yesterday and today. Pt not very aware of lower abd distention, states she guess she must have had it for a long time. Denies fever, chills, sudden increase in intraabd pressure i.e. excessive bending, hard sneeze/cough. In , pt had elective surgery for incisional hernia repair of  incision. Pt recalls mesh was used. Pt states she returned to the hospital the next day with pain and needing surgery again. Pt currently resting comfortably in bed. (2022 03:19)  Found to have uncont dm . Pt takes levemir 30 units /trulicity and multiple oral dm meds as out pt .Checks fsg occ in am only - around 200s. Admits to compliance with diet.    PAST MEDICAL & SURGICAL HISTORY:  Diabetes      Hyperlipidemia      Depression      Abdominal hernia             MEDICATIONS  (STANDING):  acetaminophen     Tablet .. 650 milliGRAM(s) Oral every 6 hours  chlorhexidine 2% Cloths 1 Application(s) Topical daily  dextrose 5% + sodium chloride 0.9%. 1000 milliLiter(s) (130 mL/Hr) IV Continuous <Continuous>  dextrose 5%. 1000 milliLiter(s) (100 mL/Hr) IV Continuous <Continuous>  dextrose 5%. 1000 milliLiter(s) (50 mL/Hr) IV Continuous <Continuous>  dextrose 50% Injectable 25 Gram(s) IV Push once  dextrose 50% Injectable 12.5 Gram(s) IV Push once  dextrose 50% Injectable 25 Gram(s) IV Push once  enoxaparin Injectable 40 milliGRAM(s) SubCutaneous every 12 hours  glucagon  Injectable 1 milliGRAM(s) IntraMuscular once  insulin glargine Injectable (LANTUS) 15 Unit(s) SubCutaneous at bedtime  insulin lispro (ADMELOG) corrective regimen sliding scale   SubCutaneous at bedtime  ketorolac   Injectable 15 milliGRAM(s) IV Push once  pantoprazole    Tablet 40 milliGRAM(s) Oral before breakfast  sertraline 100 milliGRAM(s) Oral daily    MEDICATIONS  (PRN):  dextrose Oral Gel 15 Gram(s) Oral once PRN Blood Glucose LESS THAN 70 milliGRAM(s)/deciliter  HYDROmorphone  Injectable 0.5 milliGRAM(s) IV Push every 3 hours PRN Severe Pain (7 - 10)  ondansetron Injectable 4 milliGRAM(s) IV Push every 6 hours PRN Nausea      FAMILY HISTORY:  Family history of diabetes mellitus (Grandparent)    Family history of prostate cancer (Father)        SOCIAL HISTORY:      REVIEW OF SYSTEMS:  CONSTITUTIONAL: No fever, weight loss, or fatigue  EYES: No eye pain, visual disturbances, or discharge  ENT:  No difficulty hearing, tinnitus, vertigo; No sinus or throat pain  NECK: No pain or stiffness  RESPIRATORY: No cough, wheezing, chills or hemoptysis; No Shortness of Breath  CARDIOVASCULAR: No chest pain, palpitations, passing out, dizziness, or leg swelling  GASTROINTESTINAL: No abdominal or epigastric pain. No nausea, vomiting, or hematemesis; No diarrhea or constipation. No melena or hematochezia.  GENITOURINARY: No dysuria, frequency, hematuria, or incontinence  NEUROLOGICAL: No headaches, memory loss, loss of strength, numbness, or tremors  SKIN: No itching, burning, rashes, or lesions   LYMPH Nodes: No enlarged glands  ENDOCRINE: No heat or cold intolerance; No hair loss  MUSCULOSKELETAL: No joint pain or swelling; No muscle, back, or extremity pain  PSYCHIATRIC: No depression, anxiety, mood swings, or difficulty sleeping  HEME/LYMPH: No easy bruising, or bleeding gums  ALLERGY AND IMMUNOLOGIC: No hives or eczema	        Vital Signs Last 24 Hrs  T(C): 36.6 (2022 05:21), Max: 37.1 (2022 12:53)  T(F): 97.9 (2022 05:21), Max: 98.8 (2022 12:53)  HR: 87 (2022 05:21) (80 - 100)  BP: 113/59 (2022 05:21) (113/59 - 140/89)  BP(mean): 86 (2022 13:33) (86 - 104)  RR: 17 (2022 05:21) (15 - 22)  SpO2: 95% (2022 05:21) (94% - 99%)      Constitutional:    HEENT: nad    Neck:  No JVD, bruits or thyromegaly    Respiratory:  Clear without rales or rhonchi    Cardiovascular:  RR without murmur, rub or gallop.    Gastrointestinal: Soft without hepatosplenomegaly.    Extremities: without cyanosis, clubbing or edema.    Neurological:  Oriented   x  3    . No gross sensory or motor defects.        LABS:                        11.8   7.35  )-----------( 318      ( 2022 06:10 )             36.4     06-05    146<H>  |  115<H>  |  10  ----------------------------<  234<H>  3.6   |  22  |  0.88    Ca    8.4      2022 06:10          PT/INR - ( 2022 06:10 )   PT: 12.6 sec;   INR: 1.06 ratio         PTT - ( 2022 06:10 )  PTT:29.6 sec    CAPILLARY BLOOD GLUCOSE      POCT Blood Glucose.: 220 mg/dL (2022 07:36)  POCT Blood Glucose.: 310 mg/dL (2022 22:39)  POCT Blood Glucose.: 207 mg/dL (2022 12:57)      RADIOLOGY & ADDITIONAL STUDIES:

## 2022-06-06 NOTE — DISCHARGE NOTE PROVIDER - NSDCCPCAREPLAN_GEN_ALL_CORE_FT
PRINCIPAL DISCHARGE DIAGNOSIS  Diagnosis: SBO (small bowel obstruction)  Assessment and Plan of Treatment: Patient was taken to the operating room for laparoscopic repair  with mesh of the incarcerated incisional ventral hernia without bowel resection. The patient is to follow up in 15 days. Continue diet, and what for sings of contipation, nausea and vomit.        SECONDARY DISCHARGE DIAGNOSES  Diagnosis: Incisional hernia  Assessment and Plan of Treatment:     Diagnosis: Uncontrolled diabetes mellitus with hyperglycemia  Assessment and Plan of Treatment:     Diagnosis: Incarcerated ventral hernia  Assessment and Plan of Treatment:     Diagnosis: DVT, lower extremity  Assessment and Plan of Treatment:

## 2022-06-06 NOTE — CONSULT NOTE ADULT - PROBLEM SELECTOR RECOMMENDATION 9
poorly controlled as out pt with a1c >12%  d/w pt need for basal bolus regimen upon d/c home in lieu of DKA in the past  change lantus to 24 units now  admelog prn   add premeal insulin when diet advances   fsg ac and hs  nutrition eval  d/w prim team     S/p hernia repair- tx per surgical team

## 2022-06-06 NOTE — DISCHARGE NOTE PROVIDER - NSDCMRMEDTOKEN_GEN_ALL_CORE_FT
buPROPion: orally once a day  docusate sodium 100 mg oral capsule: 1 cap(s) orally 2 times a day  glipiZIDE: orally once a day  insulin glargine 100 units/mL subcutaneous solution: 45 unit(s) subcutaneous once a day (at bedtime)  Lipitor 20 mg oral tablet: 1 tab(s) orally once a day (at bedtime)  meclizine 12.5 mg oral tablet: 1 tab(s) orally 3 times a day  metFORMIN 500 mg oral tablet: 1 tab(s) orally 2 times a day  oxybutynin 5 mg oral tablet: 1 tab(s) orally  senna oral tablet: 1 tab(s) orally 2 times a day  sertraline 100 mg oral tablet: 2 tab(s) orally once a day (at bedtime)  Trulicity Pen: subcutaneous once a week   buPROPion: orally once a day  docusate sodium 100 mg oral capsule: 1 cap(s) orally 2 times a day  Lipitor 20 mg oral tablet: 1 tab(s) orally once a day (at bedtime)  meclizine 12.5 mg oral tablet: 1 tab(s) orally 3 times a day  oxybutynin 5 mg oral tablet: 1 tab(s) orally  senna oral tablet: 1 tab(s) orally 2 times a day  sertraline 100 mg oral tablet: 2 tab(s) orally once a day (at bedtime)

## 2022-06-06 NOTE — DISCHARGE NOTE PROVIDER - HOSPITAL COURSE
This is a 67 years old with PMH DM, DKAx2 in 2014, left leg  DVT, HLD and Depression and PSH of abdominal hernia repair (2005) presented to [facility] ER with complain of abd pain for 5 days. Associated with nausea, vomiting and constipation. CT of the abdomen showed High-grade small bowel obstruction secondary to a bowel containing umbilical hernia. Short segment of small bowel contained within the hernia is thickened with adjacent fat stranding. Patient was taken to the operating room for laparoscopic repair  with mesh of the incarcerated incisional ventral hernia without bowel resection.   Patient's post-operative course was uncomplicated. Diet was advanced as tolerated and pain was well controlled on medication. On the day of discharge, patient had stable vital signs, was tolerating diet, voiding without assistance, and pain was controlled. The patient is to follow up in 15 days.

## 2022-06-06 NOTE — DISCHARGE NOTE PROVIDER - CARE PROVIDER_API CALL
Tony Delvalle (MD)  Surgery  [address] [place] [address]  Phone: [phone]  Fax: [phone]  Established Patient  Follow Up Time: 2 weeks   Tony Delvalle)  Surgery  [address] [place] [address]  Phone: [phone]  Fax: [phone]  Established Patient  Follow Up Time: 2 weeks    Romina Tucker)  EndocrinologyMetabDiabetes  [address] [place] [address]  Phone: [phone]  Fax: [phone]  Follow Up Time: 1 week

## 2022-06-06 NOTE — PROGRESS NOTE ADULT - SUBJECTIVE AND OBJECTIVE BOX
PGY1 Note discussed with supervising resident and primary attending.    Patient is a 67y old  Female who presents with a chief complaint of SBO (06 Jun 2022 07:31)      INTERVAL HPI/OVERNIGHT EVENTS: Patient was seen and examined at bedside, complained of generalized pain in abdomen. Patient complained of some nausea but no vomiting. tolerating clear liquid diet. Patient s finger sticks are high.    MEDICATIONS  (STANDING):  acetaminophen     Tablet .. 650 milliGRAM(s) Oral every 6 hours  chlorhexidine 2% Cloths 1 Application(s) Topical daily  dextrose 5% + sodium chloride 0.9%. 1000 milliLiter(s) (130 mL/Hr) IV Continuous <Continuous>  dextrose 5%. 1000 milliLiter(s) (100 mL/Hr) IV Continuous <Continuous>  dextrose 5%. 1000 milliLiter(s) (50 mL/Hr) IV Continuous <Continuous>  dextrose 50% Injectable 25 Gram(s) IV Push once  dextrose 50% Injectable 12.5 Gram(s) IV Push once  dextrose 50% Injectable 25 Gram(s) IV Push once  enoxaparin Injectable 40 milliGRAM(s) SubCutaneous every 12 hours  glucagon  Injectable 1 milliGRAM(s) IntraMuscular once  insulin glargine Injectable (LANTUS) 15 Unit(s) SubCutaneous at bedtime  insulin lispro (ADMELOG) corrective regimen sliding scale   SubCutaneous at bedtime  ketorolac   Injectable 15 milliGRAM(s) IV Push once  pantoprazole    Tablet 40 milliGRAM(s) Oral before breakfast  sertraline 100 milliGRAM(s) Oral daily    MEDICATIONS  (PRN):  dextrose Oral Gel 15 Gram(s) Oral once PRN Blood Glucose LESS THAN 70 milliGRAM(s)/deciliter  HYDROmorphone  Injectable 0.5 milliGRAM(s) IV Push every 3 hours PRN Severe Pain (7 - 10)  ondansetron Injectable 4 milliGRAM(s) IV Push every 6 hours PRN Nausea      Allergies    No Known Allergies    Intolerances        REVIEW OF SYSTEMS:  CONSTITUTIONAL: No fever, weight loss, or fatigue  RESPIRATORY: No cough, wheezing, chills or hemoptysis; No shortness of breath  CARDIOVASCULAR: No chest pain, palpitations, dizziness, or leg swelling  GASTROINTESTINAL: No abdominal or epigastric pain. No nausea, vomiting, or hematemesis; No diarrhea or constipation. No melena or hematochezia.  NEUROLOGICAL: No headaches, memory loss, loss of strength, numbness, or tremors  SKIN: No itching, burning, rashes, or lesions     Vital Signs Last 24 Hrs  T(C): 36.6 (06 Jun 2022 05:21), Max: 37.1 (05 Jun 2022 12:53)  T(F): 97.9 (06 Jun 2022 05:21), Max: 98.8 (05 Jun 2022 12:53)  HR: 87 (06 Jun 2022 05:21) (80 - 100)  BP: 113/59 (06 Jun 2022 05:21) (113/59 - 140/89)  BP(mean): 86 (05 Jun 2022 13:33) (86 - 104)  RR: 17 (06 Jun 2022 05:21) (15 - 22)  SpO2: 95% (06 Jun 2022 05:21) (94% - 99%)    PHYSICAL EXAM:  GENERAL: Obese female  HEAD:  Atraumatic, Normocephalic  EYES: EOMI, PERRLA, conjunctiva and sclera clear  NECK: Supple, No JVD, Normal thyroid  CHEST/LUNG: Clear to percussion bilaterally; No rales, rhonchi, wheezing, or rubs  HEART: Regular rate and rhythm; No murmurs, rubs, or gallops  ABDOMEN: Soft, Nontender, Nondistended; Bowel sounds present  NERVOUS SYSTEM:  Alert & Oriented X3, Good concentration; no new deficits.  EXTREMITIES:  2+ Peripheral Pulses, No clubbing, cyanosis, or edema  SKIN;    LABS:                        11.8   7.35  )-----------( 318      ( 05 Jun 2022 06:10 )             36.4     06-05    146<H>  |  115<H>  |  10  ----------------------------<  234<H>  3.6   |  22  |  0.88    Ca    8.4      05 Jun 2022 06:10      PT/INR - ( 05 Jun 2022 06:10 )   PT: 12.6 sec;   INR: 1.06 ratio         PTT - ( 05 Jun 2022 06:10 )  PTT:29.6 sec    CAPILLARY BLOOD GLUCOSE      POCT Blood Glucose.: 220 mg/dL (06 Jun 2022 07:36)  POCT Blood Glucose.: 310 mg/dL (05 Jun 2022 22:39)  POCT Blood Glucose.: 207 mg/dL (05 Jun 2022 12:57)      RADIOLOGY & ADDITIONAL TESTS:    Imaging Personally Reviewed:  [ ] YES  [ ] NO    Consultant(s) Notes Reviewed:  [ ] YES  [ ] NO

## 2022-06-06 NOTE — DISCHARGE NOTE PROVIDER - PROVIDER TOKENS
PROVIDER:[TOKEN:[[id number]],FOLLOWUP:[2 weeks],ESTABLISHEDPATIENT:[T]] PROVIDER:[TOKEN:[[id number]],FOLLOWUP:[2 weeks],ESTABLISHEDPATIENT:[T]],PROVIDER:[TOKEN:[[id number]],FOLLOWUP:[1 week]]

## 2022-06-06 NOTE — PROGRESS NOTE ADULT - ASSESSMENT
67F, with SBO due to incarcerated incisional ventral hernia. Now is s/p laparoscopic repair with mesh of incisional hernia.    Patient is HDS. Tolerating clears, no BF yet.    Diet: Clears  Pain and nausea control   Chemical DVT ppx with enoxaparin and SCD  OOBA and to the chair   PT assessment   Vitals & I/O [id number]  LAnctus and ISS. Endocrinology consult.

## 2022-06-06 NOTE — PROGRESS NOTE ADULT - ASSESSMENT
66 yo F with PMH DM, DKAx2 in 2014,left leg  DVT on Xarelto, HLD and Depression and PSH of abdominal hernia repair (2005) presents to [facility] ER c/o abd pain for 5 days, Abdominal CT remarkable for high level SBO likely 2/2 recurrent incarcerated incisional hernia. Patient admitted to surgery, medicine consulted for medical management.       1- SBO management   - Now is s/p laparoscopic repair with mesh of incisional hernia.  POD #1.   Patient started on clear liquids    2- Uncontrolled Diabetes:  - pt with history of DKA X2 in 2014 , known for being non complaint with medication   - from [facility] script review pt currently on :  * Farxiga 10mg qd,   * Trulicity 1.5/0.5 weekly,   * Glipizide 10mg qd,   * Levemir 30unites in PM,   * Metformin 1000 BID  * Pioglitazone 15mg qd   - Primary team to confirm with pharmacy ans edit med- rec on admission   - will hold all po meds . Patient received 15 of Lantus last night FS still elevated, endocrinology on board.  Would recommend to increase Lantus to 24 units at bed time with moderate sliding scale pre meals.          3- hyperlipidemia :  current meds per superscript  * Atorvastatin 20mg qd   * Vascepa 2gr BID      4- Anxiety and depression   - on sertraline 100mg qd at home   -can resume when patient is able to take PO.        5- DVT prophylaxis   IMPROVE VTE score: 5  c/w with: Lovenox S/C     [ x] Previous VTE                                    3  [ ] Thrombophilia                                  2  [ ] Lower limb paralysis                        2  (unable to hold up >15 seconds)    [ ] Current Cancer (within 6 months)        2   [x] Immobilization > 24 hrs                    1  [ ] ICU/CCU stay > 24 hrs                      1  [x] Age > 60                                         1

## 2022-06-06 NOTE — PROGRESS NOTE ADULT - ATTENDING COMMENTS
dinesh is a 66 yo. F with PMH DM, DKAx2 in 2014,l eft leg  DVT (was on Xarelto), HLD and Depression and PSH of abdominal hernia repair (2005) with c/o sharp lower abdominal pain. Admitted to surgery service for SBO 2/2 incarcerated inguinal hernia. Admitted for SBO 2/2 incarcerated hernia s/p laparoscopic hernia repair with mesh on 6/5 , medicine consulted for pre-op risk stratification and DM management    Patient reports feeling well. Her blood sugar were noted to be 220 this am.     A/P:  #S/p laparoscopic hernia repair 5/6   #SBO 2/2 incarcerated hernia    #Uncontrolled DM   #Depression  #HLD  #Hypomagnesemia     Plan:  -Pt is s/p lap hernia repair with mesh, POD #1 tolerated procedure well. On clear liquid diet, care per surgery.   -Pt with hx of DM, Hba1C 12.8 depictive of poor control. On glipizide metformin, Trulicity and Lantus at home per records, likely non-complaint with medications. C/w Lantus 24 units, HSS. D/C D5 containing IV fluids. Endo following   -Replete electrolytes Patient is a 68 yo. F with PMH DM, DKAx2 in 2014,l eft leg  DVT (was on Xarelto), HLD and Depression and PSH of abdominal hernia repair (2005) with c/o sharp lower abdominal pain. Admitted for SBO 2/2 incarcerated hernia s/p laparoscopic hernia repair with mesh on 6/5 , medicine consulted for pre-op risk stratification and DM management    Patient reports feeling well. Her blood sugar were noted to be 220 this am.     A/P:  #S/p laparoscopic hernia repair 5/6   #SBO 2/2 incarcerated hernia    #Uncontrolled DM   #Depression  #HLD  #Hypomagnesemia     Plan:  -Pt is s/p lap hernia repair with mesh, POD #1 tolerated procedure well. On clear liquid diet, care per surgery.   -Pt with hx of DM, Hba1C 12.8 depictive of poor control. On glipizide metformin, Trulicity and Lantus at home per records, likely non-complaint with medications. C/w Lantus 24 units, HSS. D/C D5 containing IV fluids. Endo following   -Replete electrolytes

## 2022-06-06 NOTE — CONSULT NOTE ADULT - ASSESSMENT
67 y.o. F with PMH DM, DKAx2 in 2014,left leg  DVT, HLD and Depression and PSH of abdominal hernia repair (2005) presents to [facility] ER c/o abd pain for 5 days.   Found to have uncont dm s/p hernia repair . Pt takes levemir 30 units /trulicity and multiple oral dm meds as out pt .Checks fsg occ in am only - around 200s. Admits to compliance with diet.
66 yo F with PMH DM, DKAx2 in 2014,left leg  DVT on Xarelto, HLD and Depression and PSH of abdominal hernia repair (2005) presents to [facility] ER c/o abd pain for 5 days, Abdominal CT remarkable for high level SBO likely 2/2 recurrent incarcerated incisional hernia. Patient admitted to surgery, medicine consulted for medical management.       1- SBO management   - per surgery     2- Uncontrolled Diabetes:  - pt with history of DKA X2 in 2014 , known for being non complaint with medication   - from [facility] script review pt currently on :  * Farxiga 10mg qd,   * Trulicity 1.5/0.5 weekly,   * Glipizide 10mg qd,   * Levemir 30unites in PM,   * Metformin 1000 BID  * Pioglitazone 15mg qd   - Primary team to confirm with pharmacy ans edit med- rec on admission   - will hold all po meds and start pt with half of dose long acting insulin: Lantus 15unit and ISS   - FS q 6hour while NPO  - check HgA1c   - UA      3- hyperlipidemia :  current meds per superscript  * Atorvastatin 20mg qd   * Vascepa 2gr BID  - check lipid profile     4- Anxiety and depression   - on sertraline 100mg qd at home   - will hold off all po meds for now        5- DVT prophylaxis   IMPROVE VTE score: 5  c/w with: Lovenox S/C     [ x] Previous VTE                                    3  [ ] Thrombophilia                                  2  [ ] Lower limb paralysis                        2  (unable to hold up >15 seconds)    [ ] Current Cancer (within 6 months)        2   [x] Immobilization > 24 hrs                    1  [ ] ICU/CCU stay > 24 hrs                      1  [x] Age > 60                                         1

## 2022-06-06 NOTE — PROGRESS NOTE ADULT - SUBJECTIVE AND OBJECTIVE BOX
SUBJECTIVE: Patient seen and examined bedside. Patient reports tolerating clears without vomit, but she is not hungry. Denies nausea or vomit. No flatus or BM yet.      enoxaparin Injectable 40 milliGRAM(s) SubCutaneous every 12 hours      Vital Signs Last 24 Hrs  T(C): 36.6 (2022 05:21), Max: 37.1 (2022 12:53)  T(F): 97.9 (2022 05:21), Max: 98.8 (2022 12:53)  HR: 87 (2022 05:21) (80 - 100)  BP: 113/59 (2022 05:21) (113/59 - 140/89)  BP(mean): 86 (2022 13:33) (86 - 104)  RR: 17 (2022 05:21) (15 - 22)  SpO2: 95% (2022 05:21) (94% - 99%)  I&O's Detail    2022 07:01  -  2022 07:00  --------------------------------------------------------  IN:    Lactated Ringers Bolus: 2200 mL  Total IN: 2200 mL    OUT:    Indwelling Catheter - Urethral (mL): 775 mL  Total OUT: 775 mL    Total NET: 1425 mL          PHYSICAL EXAMINATION   General: NAD  NEURO:  follows commands.   PULM: nonlabored breathing, no respiratory distress  ABD: soft, distended, appropriately tender, no rebound, no guarding, no tympany. Incisions CDI and without hematoma or erythema    EXTREM: WWP, no edema, no calf tenderness  VASC: No cyanosis,  no pallor.   PSYCH: Appropriate affect, answers questions appropriately      LABS:                        11.8   7.35  )-----------( 318      ( 2022 06:10 )             36.4     -    146<H>  |  115<H>  |  10  ----------------------------<  234<H>  3.6   |  22  |  0.88    Ca    8.4      2022 06:10      PT/INR - ( 2022 06:10 )   PT: 12.6 sec;   INR: 1.06 ratio         PTT - ( 2022 06:10 )  PTT:29.6 sec  Urinalysis Basic - ( 2022 09:47 )    Color: Yellow / Appearance: Slightly Turbid / S.025 / pH: x  Gluc: x / Ketone: Negative  / Bili: Negative / Urobili: Negative   Blood: x / Protein: Negative / Nitrite: Negative   Leuk Esterase: Small / RBC: 0-2 /HPF / WBC 11-25 /HPF   Sq Epi: x / Non Sq Epi: Moderate /HPF / Bacteria: Moderate /HPF

## 2022-06-06 NOTE — DISCHARGE NOTE PROVIDER - NSDCFUADDINST_GEN_ALL_CORE_FT
COme to the ED if you are having the same symptoms that made you come to ED, like nausea, vomit, belly feeling full, and constipation.     General Discharge Instructions:  Please resume all regular home medications unless specifically advised not to take a particular medication. Also, please take any new medications as prescribed.  Please get plenty of rest, continue to ambulate several times per day, and drink adequate amounts of fluids. Avoid lifting weights greater than 5-10 lbs until you follow-up with your surgeon, who will instruct you further regarding activity restrictions.  Avoid driving or operating heavy machinery while taking pain medications.  Please follow-up with your surgeon and Primary Care Provider (PCP).  Incision Care:  *Please call your doctor or nurse practitioner if you have increased pain, swelling, redness, or drainage from the incision site.  *Avoid swimming and baths until your follow-up appointment.  *You may shower, and wash surgical incisions with a mild soap and warm water. Gently pat the area dry. Never scrub the incisions.  *You have steri-strips, they will fall off on their own, if they do not fall of please remove them 7-10 days after surgery.    Warning Signs:  Please call your doctor or nurse practitioner if you experience the following:  *You experience new chest pain, pressure, squeezing or tightness.  *New or worsening cough, shortness of breath, or wheeze.  *If you are vomiting and cannot keep down fluids or your medications.  *You are getting dehydrated due to continued vomiting, diarrhea, or other reasons. Signs of dehydration include dry mouth, rapid heartbeat, or feeling dizzy or faint when standing.  *You see blood or dark/black material when you vomit or have a bowel movement.  *You experience burning when you urinate, have blood in your urine, or experience a discharge.  *Your pain is not improving within 8-12 hours or is not gone within 24 hours. Call or return immediately if your pain is getting worse, changes location, or moves to your chest or back.  *You have shaking chills, or fever greater than 101.5 degrees Fahrenheit or 38 degrees Celsius.  *Any change in your symptoms, or any new symptoms that concern you.    Follow a healthy diet for hypertension and diabetes:   - plenty of vegetables, fruits and low-fat dairy products, as well as whole grains, fish, poultry and nuts. Limit the portions of red meats, sweets and sugary beverages.    Stay Hydrated:  - Avoid sugary drinks and caffeine   - Drink plenty of water, Gatorade and other fluids low in sugar that has electrolytes. Add soup and broth to your diet.   - drink about 2000 ml or more a day, if you do not have other medical condition that requires fluid restriction.   - You should be voiding clear yellow urine more than 4 times a day.      COme to the ED if you are having the same symptoms that made you come to ED, like nausea, vomit, belly feeling full, and constipation.     General Discharge Instructions:  Please resume all regular home medications unless specifically advised not to take a particular medication. Also, please take any new medications as prescribed.  Please get plenty of rest, continue to ambulate several times per day, and drink adequate amounts of fluids. Avoid lifting weights greater than 5-10 lbs until you follow-up with your surgeon, who will instruct you further regarding activity restrictions.  Avoid driving or operating heavy machinery while taking pain medications.  Please follow-up with your surgeon and Primary Care Provider (PCP).  Incision Care:  *Please call your doctor or nurse practitioner if you have increased pain, swelling, redness, or drainage from the incision site.  *Avoid swimming and baths until your follow-up appointment.  *You may shower, and wash surgical incisions with a mild soap and warm water. Gently pat the area dry. Never scrub the incisions.  *You have steri-strips, they will fall off on their own, if they do not fall of please remove them 7-10 days after surgery.    Warning Signs:  Please call your doctor or nurse practitioner if you experience the following:  *You experience new chest pain, pressure, squeezing or tightness.  *New or worsening cough, shortness of breath, or wheeze.  *If you are vomiting and cannot keep down fluids or your medications.  *You are getting dehydrated due to continued vomiting, diarrhea, or other reasons. Signs of dehydration include dry mouth, rapid heartbeat, or feeling dizzy or faint when standing.  *You see blood or dark/black material when you vomit or have a bowel movement.  *You experience burning when you urinate, have blood in your urine, or experience a discharge.  *Your pain is not improving within 8-12 hours or is not gone within 24 hours. Call or return immediately if your pain is getting worse, changes location, or moves to your chest or back.  *You have shaking chills, or fever greater than 101.5 degrees Fahrenheit or 38 degrees Celsius.  *Any change in your symptoms, or any new symptoms that concern you.    Follow a healthy diet for hypertension and diabetes:   - plenty of vegetables, fruits and low-fat dairy products, as well as whole grains, fish, poultry and nuts. Limit the portions of red meats, sweets and sugary beverages.    Stay Hydrated:  - Avoid sugary drinks and caffeine   - Drink plenty of water, Gatorade and other fluids low in sugar that has electrolytes. Add soup and broth to your diet.   - drink about 2000 ml or more a day, if you do not have other medical condition that requires fluid restriction.   - You should be voiding clear yellow urine more than 4 times a day.     - Please refrain from lifting more than 15 pounds for 4-6 weeks  -  Use Tylenol (650 mg every 6 h ) to be rotated every 3 hours with Motrin ( 600 mg every 6 hours ) for mild-moderate pain  COme to the ED if you are having the same symptoms that made you come to ED, like nausea, vomit, belly feeling full, and constipation.     General Discharge Instructions:  Please resume all regular home medications unless specifically advised not to take a particular medication. Also, please take any new medications as prescribed.  Please get plenty of rest, continue to ambulate several times per day, and drink adequate amounts of fluids. Avoid lifting weights greater than 5-10 lbs until you follow-up with your surgeon, who will instruct you further regarding activity restrictions.  Avoid driving or operating heavy machinery while taking pain medications.  Please follow-up with your surgeon and Primary Care Provider (PCP).  Incision Care:  *Please call your doctor or nurse practitioner if you have increased pain, swelling, redness, or drainage from the incision site.  *Avoid swimming and baths until your follow-up appointment.  *You may shower, and wash surgical incisions with a mild soap and warm water. Gently pat the area dry. Never scrub the incisions.  *You have steri-strips, they will fall off on their own, if they do not fall of please remove them 7-10 days after surgery.    Warning Signs:  Please call your doctor or nurse practitioner if you experience the following:  *You experience new chest pain, pressure, squeezing or tightness.  *New or worsening cough, shortness of breath, or wheeze.  *If you are vomiting and cannot keep down fluids or your medications.  *You are getting dehydrated due to continued vomiting, diarrhea, or other reasons. Signs of dehydration include dry mouth, rapid heartbeat, or feeling dizzy or faint when standing.  *You see blood or dark/black material when you vomit or have a bowel movement.  *You experience burning when you urinate, have blood in your urine, or experience a discharge.  *Your pain is not improving within 8-12 hours or is not gone within 24 hours. Call or return immediately if your pain is getting worse, changes location, or moves to your chest or back.  *You have shaking chills, or fever greater than 101.5 degrees Fahrenheit or 38 degrees Celsius.  *Any change in your symptoms, or any new symptoms that concern you.    Follow a healthy diet for hypertension and diabetes:   - plenty of vegetables, fruits and low-fat dairy products, as well as whole grains, fish, poultry and nuts. Limit the portions of red meats, sweets and sugary beverages.    Stay Hydrated:  - Avoid sugary drinks and caffeine   - Drink plenty of water, Gatorade and other fluids low in sugar that has electrolytes. Add soup and broth to your diet.   - drink about 2000 ml or more a day, if you do not have other medical condition that requires fluid restriction.   - You should be voiding clear yellow urine more than 4 times a day.     - Please refrain from lifting more than 15 pounds for 4-6 weeks  -  Use Tylenol (650 mg every 6 h ) to be rotated every 3 hours with Motrin ( 600 mg every 6 hours ) for mild-moderate pain   - For diabetes: Please use Lantus ( 22 Units subcutaneous at bedtime ) and Admelog ( 3 Units 3 times per day before meals) , f/u With Dr Tucker in 1 week after discharge ( see information in dc paperwork instruction )

## 2022-06-07 LAB
GLUCOSE BLDC GLUCOMTR-MCNC: 138 MG/DL — HIGH (ref 70–99)
GLUCOSE BLDC GLUCOMTR-MCNC: 157 MG/DL — HIGH (ref 70–99)
GLUCOSE BLDC GLUCOMTR-MCNC: 201 MG/DL — HIGH (ref 70–99)
GLUCOSE BLDC GLUCOMTR-MCNC: 219 MG/DL — HIGH (ref 70–99)

## 2022-06-07 PROCEDURE — 99233 SBSQ HOSP IP/OBS HIGH 50: CPT | Mod: GC

## 2022-06-07 RX ADMIN — Medication 2: at 07:58

## 2022-06-07 RX ADMIN — Medication 650 MILLIGRAM(S): at 11:02

## 2022-06-07 RX ADMIN — Medication 0: at 22:07

## 2022-06-07 RX ADMIN — Medication 650 MILLIGRAM(S): at 17:03

## 2022-06-07 RX ADMIN — ENOXAPARIN SODIUM 40 MILLIGRAM(S): 100 INJECTION SUBCUTANEOUS at 07:57

## 2022-06-07 RX ADMIN — Medication 650 MILLIGRAM(S): at 23:42

## 2022-06-07 RX ADMIN — Medication 650 MILLIGRAM(S): at 17:31

## 2022-06-07 RX ADMIN — Medication 650 MILLIGRAM(S): at 13:11

## 2022-06-07 RX ADMIN — Medication 650 MILLIGRAM(S): at 00:40

## 2022-06-07 RX ADMIN — Medication 4: at 12:21

## 2022-06-07 RX ADMIN — INSULIN GLARGINE 24 UNIT(S): 100 INJECTION, SOLUTION SUBCUTANEOUS at 21:48

## 2022-06-07 RX ADMIN — ENOXAPARIN SODIUM 40 MILLIGRAM(S): 100 INJECTION SUBCUTANEOUS at 21:48

## 2022-06-07 RX ADMIN — PANTOPRAZOLE SODIUM 40 MILLIGRAM(S): 20 TABLET, DELAYED RELEASE ORAL at 06:06

## 2022-06-07 RX ADMIN — SERTRALINE 100 MILLIGRAM(S): 25 TABLET, FILM COATED ORAL at 11:01

## 2022-06-07 RX ADMIN — Medication 650 MILLIGRAM(S): at 06:23

## 2022-06-07 RX ADMIN — Medication 650 MILLIGRAM(S): at 05:36

## 2022-06-07 NOTE — PROGRESS NOTE ADULT - SUBJECTIVE AND OBJECTIVE BOX
PGY1 Note discussed with supervising resident and primary attending.    Patient is a 67y old  Female who presents with a chief complaint of SBO (07 Jun 2022 10:16)      INTERVAL HPI/OVERNIGHT EVENTS: Patient was seen and examined at bed side, abdominal pain is improved. no vomiting reported. Patient had a BM and is able to pass gas.    MEDICATIONS  (STANDING):  acetaminophen     Tablet .. 650 milliGRAM(s) Oral every 6 hours  dextrose 5%. 1000 milliLiter(s) (100 mL/Hr) IV Continuous <Continuous>  dextrose 5%. 1000 milliLiter(s) (50 mL/Hr) IV Continuous <Continuous>  dextrose 50% Injectable 25 Gram(s) IV Push once  dextrose 50% Injectable 12.5 Gram(s) IV Push once  dextrose 50% Injectable 25 Gram(s) IV Push once  enoxaparin Injectable 40 milliGRAM(s) SubCutaneous every 12 hours  glucagon  Injectable 1 milliGRAM(s) IntraMuscular once  insulin glargine Injectable (LANTUS) 24 Unit(s) SubCutaneous at bedtime  insulin lispro (ADMELOG) corrective regimen sliding scale   SubCutaneous at bedtime  insulin lispro (ADMELOG) corrective regimen sliding scale   SubCutaneous three times a day before meals  ketorolac   Injectable 15 milliGRAM(s) IV Push once  pantoprazole    Tablet 40 milliGRAM(s) Oral before breakfast  sertraline 100 milliGRAM(s) Oral daily    MEDICATIONS  (PRN):  dextrose Oral Gel 15 Gram(s) Oral once PRN Blood Glucose LESS THAN 70 milliGRAM(s)/deciliter  HYDROmorphone  Injectable 0.5 milliGRAM(s) IV Push every 3 hours PRN Severe Pain (7 - 10)  ondansetron Injectable 4 milliGRAM(s) IV Push every 6 hours PRN Nausea      Allergies    No Known Allergies    Intolerances        REVIEW OF SYSTEMS:  CONSTITUTIONAL: No fever, weight loss, or fatigue  RESPIRATORY: No cough, wheezing, chills or hemoptysis; No shortness of breath  CARDIOVASCULAR: No chest pain, palpitations, dizziness, or leg swelling  GASTROINTESTINAL: No abdominal or epigastric pain. No nausea, vomiting, or hematemesis; No diarrhea or constipation. No melena or hematochezia.  NEUROLOGICAL: No headaches, memory loss, loss of strength, numbness, or tremors  SKIN: No itching, burning, rashes, or lesions     Vital Signs Last 24 Hrs  T(C): 36.6 (07 Jun 2022 14:27), Max: 36.9 (07 Jun 2022 05:35)  T(F): 97.8 (07 Jun 2022 14:27), Max: 98.4 (07 Jun 2022 05:35)  HR: 94 (07 Jun 2022 14:27) (92 - 97)  BP: 124/79 (07 Jun 2022 14:27) (118/76 - 135/80)  BP(mean): --  RR: 18 (07 Jun 2022 14:27) (18 - 18)  SpO2: 95% (07 Jun 2022 14:27) (95% - 96%)    PHYSICAL EXAM:  GENERAL: NAD, OOB to chair  HEAD:  Atraumatic, Normocephalic  EYES: EOMI, PERRLA, conjunctiva and sclera clear  NECK: Supple, No JVD, Normal thyroid  CHEST/LUNG: Clear to percussion bilaterally; No rales, rhonchi, wheezing, or rubs  HEART: Regular rate and rhythm; No murmurs, rubs, or gallops  ABDOMEN: Soft, Nontender, Nondistended; Bowel sounds present  NERVOUS SYSTEM:  Alert & Oriented X3, Good concentration; Motor Strength 5/5 B/L   EXTREMITIES:  2+ Peripheral Pulses, No clubbing, cyanosis, or edema  SKIN;    LABS:                        11.9   8.57  )-----------( 314      ( 06 Jun 2022 11:52 )             36.3     06-06    143  |  114<H>  |  8   ----------------------------<  160<H>  3.6   |  21<L>  |  0.75    Ca    7.6<L>      06 Jun 2022 11:52  Phos  2.5     06-06  Mg     1.3     06-06          CAPILLARY BLOOD GLUCOSE      POCT Blood Glucose.: 219 mg/dL (07 Jun 2022 11:44)  POCT Blood Glucose.: 157 mg/dL (07 Jun 2022 07:50)  POCT Blood Glucose.: 149 mg/dL (06 Jun 2022 21:03)  POCT Blood Glucose.: 234 mg/dL (06 Jun 2022 16:18)      RADIOLOGY & ADDITIONAL TESTS:    Imaging Personally Reviewed:  [ ] YES  [ ] NO    Consultant(s) Notes Reviewed:  [ ] YES  [ ] NO

## 2022-06-07 NOTE — PROGRESS NOTE ADULT - ASSESSMENT
67F, with SBO due to incarcerated incisional ventral hernia. Now is s/p laparoscopic repair with mesh of incisional hernia.    Patient is HDS. Tolerating clears, no BF yet. POD2.    Diet: Clears - will advance as tolerated  Pain and nausea control   Chemical DVT ppx with enoxaparin and SCD  OOBA and to the chair   PT assessment   Vitals & I/O [id number]  LAnctus and ISS. medicine following

## 2022-06-07 NOTE — PROGRESS NOTE ADULT - SUBJECTIVE AND OBJECTIVE BOX
Interval Events:  pt in nad  tolerating regular diet this am     Allergies    No Known Allergies    Intolerances      Endocrine/Metabolic Medications:  dextrose 50% Injectable 25 Gram(s) IV Push once  dextrose 50% Injectable 12.5 Gram(s) IV Push once  dextrose 50% Injectable 25 Gram(s) IV Push once  dextrose Oral Gel 15 Gram(s) Oral once PRN  glucagon  Injectable 1 milliGRAM(s) IntraMuscular once  insulin glargine Injectable (LANTUS) 24 Unit(s) SubCutaneous at bedtime  insulin lispro (ADMELOG) corrective regimen sliding scale   SubCutaneous at bedtime  insulin lispro (ADMELOG) corrective regimen sliding scale   SubCutaneous three times a day before meals      Vital Signs Last 24 Hrs  T(C): 36.9 (07 Jun 2022 05:35), Max: 36.9 (07 Jun 2022 05:35)  T(F): 98.4 (07 Jun 2022 05:35), Max: 98.4 (07 Jun 2022 05:35)  HR: 97 (07 Jun 2022 05:35) (92 - 100)  BP: 135/80 (07 Jun 2022 05:35) (118/76 - 135/80)  BP(mean): --  RR: 18 (07 Jun 2022 05:35) (18 - 18)  SpO2: 96% (07 Jun 2022 05:35) (93% - 98%)      PHYSICAL EXAM  All physical exam findings normal, except those marked:  General:	Alert, active, cooperative, NAD, well hydrated  .		[] Abnormal:  Neck		Normal: supple, no cervical adenopathy, no palpable thyroid  .		[] Abnormal:  Cardiovascular	Normal: regular rate, normal S1, S2, no murmurs  .		[] Abnormal:  Respiratory	Normal: no chest wall deformity, normal respiratory pattern, CTA B/L  .		[] Abnormal:  Abdominal	Normal: soft, ND, NT, bowel sounds present, no masses, no organomegaly  .		[] Abnormal:  		Normal normal genitalia, testes descended, circumcised/uncircumcised  .		Dilan stage:			Breast dlian:  .		Menstrual history:  .		[] Abnormal:  Extremities	Normal: FROM x4  .		[] Abnormal:  Skin		Normal: intact and not indurated, no rash, no acanthosis nigricans  .		[] Abnormal:  Neurologic	Normal: grossly intact  .		[] Abnormal:    LABS                        11.9   8.57  )-----------( 314      ( 06 Jun 2022 11:52 )             36.3                               143    |  114    |  8                   Calcium: 7.6   / iCa: x      (06-06 @ 11:52)    ----------------------------<  160       Magnesium: 1.3                              3.6     |  21     |  0.75             Phosphorous: 2.5        CAPILLARY BLOOD GLUCOSE      POCT Blood Glucose.: 157 mg/dL (07 Jun 2022 07:50)  POCT Blood Glucose.: 149 mg/dL (06 Jun 2022 21:03)  POCT Blood Glucose.: 234 mg/dL (06 Jun 2022 16:18)  POCT Blood Glucose.: 149 mg/dL (06 Jun 2022 11:46)        Assesment/plan    · Assessment	  67 y.o. F with PMH DM, DKAx2 in 2014,left leg  DVT, HLD and Depression and PSH of abdominal hernia repair (2005) presents to [facility] ER c/o abd pain for 5 days.   Found to have uncont dm s/p hernia repair . Pt takes levemir 30 units /trulicity and multiple oral dm meds as out pt .Checks fsg occ in am only - around 200s. Admits to compliance with diet.     Problem/Recommendation - 1:  ·  Problem: Uncontrolled diabetes mellitus with hyperglycemia.   ·  Recommendation: poorly controlled as out pt with a1c >12%  d/w pt need for basal bolus regimen upon d/c home in lieu of DKA in the past  better controlled now  cont lantus 24 units   add premeal insulin when diet advances   fsg ac and hs  nutrition eval- f/u pt and family requesting   d/w prim team     S/p hernia repair- tx per surgical team.

## 2022-06-07 NOTE — CHART NOTE - NSCHARTNOTEFT_GEN_A_CORE
Assessment:   Patient is a 67y old  Female who presents with a chief complaint of SBO (07 Jun 2022 10:16). Endocrinologist reported pt/ family wants a list of foods pt can eat. Pt visited, friend/ partner of 8 years present. Pt reports never having received diet papers. reports she was drinking ~ 3 cups orange juice/ day. Provided diet education/ copy (preferred language: English). Suggested alternative drinks. Emphasized portion control. suggested using menu slips as teaching tool. Pt/ partner voiced appreciation for visit. Pt's breakfast tray observed, had eaten most. Reports some abdomina pain.      Factors impacting intake: [ ] none [ ] nausea  [ ] vomiting [ ] diarrhea [ ] constipation  [ ]chewing problems [ ] swallowing issues  [x ] other: altered GI fx    Diet Prescription: Diet, Regular:   Consistent Carbohydrate {No Snacks}  DASH/ TLC {Sodium & Cholesterol Restricted} (06-07-22 @ 08:41)      Pertinent Medications: MEDICATIONS  (STANDING):  acetaminophen     Tablet .. 650 milliGRAM(s) Oral every 6 hours  dextrose 5%. 1000 milliLiter(s) (100 mL/Hr) IV Continuous <Continuous>  dextrose 5%. 1000 milliLiter(s) (50 mL/Hr) IV Continuous <Continuous>  dextrose 50% Injectable 25 Gram(s) IV Push once  dextrose 50% Injectable 12.5 Gram(s) IV Push once  dextrose 50% Injectable 25 Gram(s) IV Push once  enoxaparin Injectable 40 milliGRAM(s) SubCutaneous every 12 hours  glucagon  Injectable 1 milliGRAM(s) IntraMuscular once  insulin glargine Injectable (LANTUS) 24 Unit(s) SubCutaneous at bedtime  insulin lispro (ADMELOG) corrective regimen sliding scale   SubCutaneous at bedtime  insulin lispro (ADMELOG) corrective regimen sliding scale   SubCutaneous three times a day before meals  ketorolac   Injectable 15 milliGRAM(s) IV Push once  pantoprazole    Tablet 40 milliGRAM(s) Oral before breakfast  sertraline 100 milliGRAM(s) Oral daily    MEDICATIONS  (PRN):  dextrose Oral Gel 15 Gram(s) Oral once PRN Blood Glucose LESS THAN 70 milliGRAM(s)/deciliter  HYDROmorphone  Injectable 0.5 milliGRAM(s) IV Push every 3 hours PRN Severe Pain (7 - 10)  ondansetron Injectable 4 milliGRAM(s) IV Push every 6 hours PRN Nausea    Pertinent Labs: 06-06 Na143 mmol/L Glu 160 mg/dL<H> K+ 3.6 mmol/L Cr  0.75 mg/dL BUN 8 mg/dL 06-06 Phos 2.5 mg/dL 06-04 Alb 2.8 g/dL<L> 6/5 A1c 12.8 <H>     CAPILLARY BLOOD GLUCOSE      POCT Blood Glucose.: 157 mg/dL (07 Jun 2022 07:50)  POCT Blood Glucose.: 149 mg/dL (06 Jun 2022 21:03)  POCT Blood Glucose.: 234 mg/dL (06 Jun 2022 16:18)      Estimated Needs:   [x ] no change since previous assessment  [ ] recalculated:       Previous Nutrition Diagnosis:   [ ] Altered GI function  [x ]Inadequate Oral Intake [ ] Swallowing Difficulty   [ ] Altered nutrition related labs [ ] Increased Nutrient Needs [ ] Overweight/Obesity   [ ] Unintended Weight Loss [ ] Food & Nutrition Related Knowledge Deficit [ ] Malnutrition   [ ] Other:     Nutrition Diagnosis is [ ] ongoing  [x ] resolving with increased diet/ increased intake    New Nutrition Diagnosis: [x ]altered nutrition related lab related to uncontrolled blood glucose as evidenced by A1c 12.8      Interventions:   Recommend  [x ] Diet advanced to solids  [ ] Nutrition Supplement  [ ] Nutrition Support  [x ] Other: Diet ed/ copy given. MD to monitor. RD available.     Monitoring and Evaluation:   [x ] PO intake [ x ] Tolerance to diet prescription [ x ] weights [ x ] labs[ x ] follow up per protocol  [ ] other:

## 2022-06-07 NOTE — PROGRESS NOTE ADULT - ATTENDING COMMENTS
68 yo. F with PMH DM, DKAx2 in 2014,l eft leg  DVT (was on Xarelto), HLD and Depression and PSH of abdominal hernia repair (2005) with c/o sharp lower abdominal pain. Admitted for SBO 2/2 incarcerated hernia s/p laparoscopic hernia repair with mesh on 6/5 , medicine consulted for pre-op risk stratification and DM management    Patient reports feeling well and able to have small BM and passing fabrizio. Was able to tolerate diet this AM    A/P:  #S/p laparoscopic hernia repair 5/6   #SBO 2/2 incarcerated hernia    #Uncontrolled DM   #Depression  #HLD  #Hypomagnesemia     Plan:  -Pt is s/p lap hernia repair with mesh, POD #2 tolerated procedure well. diet advanced per surgery.   -Pt with hx of DM, Hba1C 12.8 depictive of poor control. On glipizide metformin, Trulicity and Lantus at home per records, likely non-complaint with medications. C/w Lantus 24 units, HSS. D/C D5 containing IV fluids. Endo following   -Replete electrolytes  - Medicine will sign off. Can reconsult as needed

## 2022-06-07 NOTE — PROGRESS NOTE ADULT - ASSESSMENT
66 yo F with PMH DM, DKAx2 in 2014,left leg  DVT on Xarelto, HLD and Depression and PSH of abdominal hernia repair (2005) presents to [facility] ER c/o abd pain for 5 days, Abdominal CT remarkable for high level SBO likely 2/2 recurrent incarcerated incisional hernia. Patient admitted to surgery, medicine consulted for medical management.       1- SBO management   - Now is s/p laparoscopic repair with mesh of incisional hernia.  POD #2  diet advanced to regular.    2- Uncontrolled Diabetes:  - pt with history of DKA X2 in 2014 , known for being non complaint with medication   - from [facility] script review pt currently on :  * Farxiga 10mg qd,   * Trulicity 1.5/0.5 weekly,   * Glipizide 10mg qd,   * Levemir 30unites in PM,   * Metformin 1000 BID  * Pioglitazone 15mg qd   - Primary team to confirm with pharmacy ans edit med- rec on admission   - will hold all po meds . Patients blood sugar better controlled on Lantus 24 at bed time and moderate sliding scale.  -Discharge regimen as per endo.      3- hyperlipidemia :  current meds per superscript  * Atorvastatin 20mg qd   * Vascepa 2gr BID      4- Anxiety and depression   - on sertraline 100mg qd at home   -resumed    5- DVT prophylaxis   IMPROVE VTE score: 5  c/w with: Lovenox S/C     [ x] Previous VTE                                    3  [ ] Thrombophilia                                  2  [ ] Lower limb paralysis                        2  (unable to hold up >15 seconds)    [ ] Current Cancer (within 6 months)        2   [x] Immobilization > 24 hrs                    1  [ ] ICU/CCU stay > 24 hrs                      1  [x] Age > 60                                         1      Patient is medically stable , medicine will sign off, Follow endocrinology recs for discharge diabetic regimen. Reconsult if needed.

## 2022-06-07 NOTE — PROGRESS NOTE ADULT - SUBJECTIVE AND OBJECTIVE BOX
SUBJECTIVE: Patient seen and examined bedside. Patient reports feeling fine. Had a small BM yesterday. Hahn was removed and passed TOV. Was working with PT yesterday. No nausea, vomit or discomfort with the clear liquid diet.      enoxaparin Injectable 40 milliGRAM(s) SubCutaneous every 12 hours      Vital Signs Last 24 Hrs  T(C): 36.9 (07 Jun 2022 05:35), Max: 36.9 (07 Jun 2022 05:35)  T(F): 98.4 (07 Jun 2022 05:35), Max: 98.4 (07 Jun 2022 05:35)  HR: 97 (07 Jun 2022 05:35) (92 - 100)  BP: 135/80 (07 Jun 2022 05:35) (118/76 - 135/80)  BP(mean): --  RR: 18 (07 Jun 2022 05:35) (18 - 18)  SpO2: 96% (07 Jun 2022 05:35) (93% - 98%)  I&O's Detail    06 Jun 2022 07:01  -  07 Jun 2022 07:00  --------------------------------------------------------  IN:  Total IN: 0 mL    OUT:    Indwelling Catheter - Urethral (mL): 200 mL  Total OUT: 200 mL    Total NET: -200 mL          PHYSICAL EXAMINATION   General: NAD  NEURO:  follows commands.   PULM: nonlabored breathing, no respiratory distress  ABD: soft, nondistended, appropriately tender, no rebound, no guarding, no tympany. Incisions CDI and without hematoma or erythema    EXTREM: WWP, no edema, no calf tenderness  VASC: No cyanosis,  no pallor.   PSYCH: Appropriate affect, answers questions appropriately      LABS:                        11.9   8.57  )-----------( 314      ( 06 Jun 2022 11:52 )             36.3     06-06    143  |  114<H>  |  8   ----------------------------<  160<H>  3.6   |  21<L>  |  0.75    Ca    7.6<L>      06 Jun 2022 11:52  Phos  2.5     06-06  Mg     1.3     06-06

## 2022-06-08 VITALS
OXYGEN SATURATION: 96 % | DIASTOLIC BLOOD PRESSURE: 80 MMHG | RESPIRATION RATE: 16 BRPM | HEART RATE: 91 BPM | TEMPERATURE: 98 F | SYSTOLIC BLOOD PRESSURE: 121 MMHG

## 2022-06-08 LAB
GLUCOSE BLDC GLUCOMTR-MCNC: 154 MG/DL — HIGH (ref 70–99)
GLUCOSE BLDC GLUCOMTR-MCNC: 82 MG/DL — SIGNIFICANT CHANGE UP (ref 70–99)
GLUCOSE BLDC GLUCOMTR-MCNC: 85 MG/DL — SIGNIFICANT CHANGE UP (ref 70–99)
SARS-COV-2 RNA SPEC QL NAA+PROBE: SIGNIFICANT CHANGE UP

## 2022-06-08 PROCEDURE — 80048 BASIC METABOLIC PNL TOTAL CA: CPT

## 2022-06-08 PROCEDURE — 93005 ELECTROCARDIOGRAM TRACING: CPT

## 2022-06-08 PROCEDURE — 74176 CT ABD & PELVIS W/O CONTRAST: CPT | Mod: MA

## 2022-06-08 PROCEDURE — 83735 ASSAY OF MAGNESIUM: CPT

## 2022-06-08 PROCEDURE — 83036 HEMOGLOBIN GLYCOSYLATED A1C: CPT

## 2022-06-08 PROCEDURE — 87635 SARS-COV-2 COVID-19 AMP PRB: CPT

## 2022-06-08 PROCEDURE — 82962 GLUCOSE BLOOD TEST: CPT

## 2022-06-08 PROCEDURE — 85730 THROMBOPLASTIN TIME PARTIAL: CPT

## 2022-06-08 PROCEDURE — 85025 COMPLETE CBC W/AUTO DIFF WBC: CPT

## 2022-06-08 PROCEDURE — 86803 HEPATITIS C AB TEST: CPT

## 2022-06-08 PROCEDURE — 81001 URINALYSIS AUTO W/SCOPE: CPT

## 2022-06-08 PROCEDURE — 86850 RBC ANTIBODY SCREEN: CPT

## 2022-06-08 PROCEDURE — 83690 ASSAY OF LIPASE: CPT

## 2022-06-08 PROCEDURE — 99285 EMERGENCY DEPT VISIT HI MDM: CPT

## 2022-06-08 PROCEDURE — 86923 COMPATIBILITY TEST ELECTRIC: CPT

## 2022-06-08 PROCEDURE — 96375 TX/PRO/DX INJ NEW DRUG ADDON: CPT

## 2022-06-08 PROCEDURE — 87086 URINE CULTURE/COLONY COUNT: CPT

## 2022-06-08 PROCEDURE — 80053 COMPREHEN METABOLIC PANEL: CPT

## 2022-06-08 PROCEDURE — 85027 COMPLETE CBC AUTOMATED: CPT

## 2022-06-08 PROCEDURE — C1889: CPT

## 2022-06-08 PROCEDURE — 36415 COLL VENOUS BLD VENIPUNCTURE: CPT

## 2022-06-08 PROCEDURE — 86901 BLOOD TYPING SEROLOGIC RH(D): CPT

## 2022-06-08 PROCEDURE — 85610 PROTHROMBIN TIME: CPT

## 2022-06-08 PROCEDURE — 86900 BLOOD TYPING SEROLOGIC ABO: CPT

## 2022-06-08 PROCEDURE — 84100 ASSAY OF PHOSPHORUS: CPT

## 2022-06-08 PROCEDURE — 96374 THER/PROPH/DIAG INJ IV PUSH: CPT

## 2022-06-08 RX ORDER — INSULIN LISPRO 100/ML
3 VIAL (ML) SUBCUTANEOUS
Qty: 1 | Refills: 0
Start: 2022-06-08 | End: 2022-06-21

## 2022-06-08 RX ORDER — INSULIN LISPRO 100/ML
3 VIAL (ML) SUBCUTANEOUS
Refills: 0 | Status: DISCONTINUED | OUTPATIENT
Start: 2022-06-08 | End: 2022-06-08

## 2022-06-08 RX ORDER — INSULIN LISPRO 100/ML
VIAL (ML) SUBCUTANEOUS
Refills: 0 | Status: DISCONTINUED | OUTPATIENT
Start: 2022-06-08 | End: 2022-06-08

## 2022-06-08 RX ORDER — INSULIN GLARGINE 100 [IU]/ML
22 INJECTION, SOLUTION SUBCUTANEOUS
Qty: 308 | Refills: 0
Start: 2022-06-08 | End: 2022-06-21

## 2022-06-08 RX ORDER — INSULIN GLARGINE 100 [IU]/ML
22 INJECTION, SOLUTION SUBCUTANEOUS AT BEDTIME
Refills: 0 | Status: DISCONTINUED | OUTPATIENT
Start: 2022-06-08 | End: 2022-06-08

## 2022-06-08 RX ORDER — DULAGLUTIDE 4.5 MG/.5ML
0 INJECTION, SOLUTION SUBCUTANEOUS
Qty: 0 | Refills: 0 | DISCHARGE

## 2022-06-08 RX ADMIN — Medication 650 MILLIGRAM(S): at 06:02

## 2022-06-08 RX ADMIN — PANTOPRAZOLE SODIUM 40 MILLIGRAM(S): 20 TABLET, DELAYED RELEASE ORAL at 06:03

## 2022-06-08 RX ADMIN — Medication 650 MILLIGRAM(S): at 03:40

## 2022-06-08 RX ADMIN — SERTRALINE 100 MILLIGRAM(S): 25 TABLET, FILM COATED ORAL at 12:22

## 2022-06-08 RX ADMIN — Medication 650 MILLIGRAM(S): at 19:02

## 2022-06-08 RX ADMIN — Medication 1: at 12:22

## 2022-06-08 RX ADMIN — ENOXAPARIN SODIUM 40 MILLIGRAM(S): 100 INJECTION SUBCUTANEOUS at 09:42

## 2022-06-08 RX ADMIN — Medication 650 MILLIGRAM(S): at 12:22

## 2022-06-08 RX ADMIN — Medication 650 MILLIGRAM(S): at 12:52

## 2022-06-08 RX ADMIN — Medication 3 UNIT(S): at 12:22

## 2022-06-08 NOTE — DISCHARGE NOTE NURSING/CASE MANAGEMENT/SOCIAL WORK - MODE OF TRANSPORTATION
Left msg to discuss with pt, informed her to let md at hospital know Ancelmorakesh's name in case they need to contact her  
error
Ambulette

## 2022-06-08 NOTE — PROGRESS NOTE ADULT - PROVIDER SPECIALTY LIST ADULT
Internal Medicine
Endocrinology
Endocrinology
Internal Medicine
Internal Medicine
Surgery

## 2022-06-08 NOTE — PROGRESS NOTE ADULT - SUBJECTIVE AND OBJECTIVE BOX
Interval Events:  pt in nad    Allergies    No Known Allergies    Intolerances      Endocrine/Metabolic Medications:  dextrose 50% Injectable 25 Gram(s) IV Push once  dextrose 50% Injectable 12.5 Gram(s) IV Push once  dextrose 50% Injectable 25 Gram(s) IV Push once  dextrose Oral Gel 15 Gram(s) Oral once PRN  glucagon  Injectable 1 milliGRAM(s) IntraMuscular once  insulin glargine Injectable (LANTUS) 24 Unit(s) SubCutaneous at bedtime  insulin lispro (ADMELOG) corrective regimen sliding scale   SubCutaneous at bedtime  insulin lispro (ADMELOG) corrective regimen sliding scale   SubCutaneous three times a day before meals      Vital Signs Last 24 Hrs  T(C): 36.8 (08 Jun 2022 05:17), Max: 36.8 (08 Jun 2022 05:17)  T(F): 98.3 (08 Jun 2022 05:17), Max: 98.3 (08 Jun 2022 05:17)  HR: 85 (08 Jun 2022 05:17) (85 - 94)  BP: 119/66 (08 Jun 2022 05:17) (119/66 - 124/79)  BP(mean): --  RR: 17 (08 Jun 2022 05:17) (17 - 18)  SpO2: 98% (08 Jun 2022 05:17) (95% - 98%)      PHYSICAL EXAM  All physical exam findings normal, except those marked:  General:	Alert, active, cooperative, NAD, well hydrated  .		[] Abnormal:  Neck		Normal: supple, no cervical adenopathy, no palpable thyroid  .		[] Abnormal:  Cardiovascular	Normal: regular rate, normal S1, S2, no murmurs  .		[] Abnormal:  Respiratory	Normal: no chest wall deformity, normal respiratory pattern, CTA B/L  .		[] Abnormal:  Abdominal	Normal: soft, ND, NT, bowel sounds present, no masses, no organomegaly  .		[] Abnormal:  		Normal normal genitalia, testes descended, circumcised/uncircumcised  .		Dilan stage:			Breast dilan:  .		Menstrual history:  .		[] Abnormal:  Extremities	Normal: FROM x4  .		[] Abnormal:  Skin		Normal: intact and not indurated, no rash, no acanthosis nigricans  .		[] Abnormal:  Neurologic	Normal: grossly intact  .		[] Abnormal:    LABS        CAPILLARY BLOOD GLUCOSE      POCT Blood Glucose.: 82 mg/dL (08 Jun 2022 07:43)  POCT Blood Glucose.: 201 mg/dL (07 Jun 2022 21:24)  POCT Blood Glucose.: 138 mg/dL (07 Jun 2022 16:29)  POCT Blood Glucose.: 219 mg/dL (07 Jun 2022 11:44)        Assesment/plan    67 y.o. F with PMH DM, DKAx2 in 2014,left leg  DVT, HLD and Depression and PSH of abdominal hernia repair (2005) presents to [facility] ER c/o abd pain for 5 days.   Found to have uncont dm s/p hernia repair . Pt takes levemir 30 units /trulicity and multiple oral dm meds as out pt .Checks fsg occ in am only - around 200s. Admits to compliance with diet.     Problem/Recommendation - 1:  ·  Problem: Uncontrolled diabetes mellitus with hyperglycemia.   ·  Recommendation: poorly controlled as out pt with a1c >12%  d/w pt need for basal bolus regimen upon d/c home in lieu of DKA in the past  better controlled now  decrease lantus 22 units   add premeal insulin 3 units  decrease correction doses to low dose   fsg ac and hs  nutrition eval- f/u pt and family requesting   d/w prim team     S/p hernia repair- tx per surgical team.

## 2022-06-08 NOTE — PROGRESS NOTE ADULT - SUBJECTIVE AND OBJECTIVE BOX
SUBJECTIVE: Patient seen and examined bedside. Patient reports feeling fine, OOB to chair with PT. Tolerated the diet. Having BM and flatus      enoxaparin Injectable 40 milliGRAM(s) SubCutaneous every 12 hours      Vital Signs Last 24 Hrs  T(C): 36.8 (08 Jun 2022 05:17), Max: 36.8 (08 Jun 2022 05:17)  T(F): 98.3 (08 Jun 2022 05:17), Max: 98.3 (08 Jun 2022 05:17)  HR: 85 (08 Jun 2022 05:17) (85 - 94)  BP: 119/66 (08 Jun 2022 05:17) (119/66 - 124/79)  BP(mean): --  RR: 17 (08 Jun 2022 05:17) (17 - 18)  SpO2: 98% (08 Jun 2022 05:17) (95% - 98%)  I&O's Detail      PHYSICAL EXAMINATION   General: NAD  NEURO:  follows commands.   PULM: nonlabored breathing, no respiratory distress  ABD: soft, nondistended, appropriately tender, no rebound, no guarding, no tympany. Incisions CDI and without hematoma or erythema    EXTREM: WWP, no edema, no calf tenderness  VASC: No cyanosis,  no pallor.   PSYCH: Appropriate affect, answers questions appropriately      LABS:                        11.9   8.57  )-----------( 314      ( 06 Jun 2022 11:52 )             36.3     06-06    143  |  114<H>  |  8   ----------------------------<  160<H>  3.6   |  21<L>  |  0.75    Ca    7.6<L>      06 Jun 2022 11:52  Phos  2.5     06-06  Mg     1.3     06-06

## 2022-06-08 NOTE — PROGRESS NOTE ADULT - ASSESSMENT
67F, with SBO due to incarcerated incisional ventral hernia. Now is s/p laparoscopic repair with mesh of incisional hernia.    Patient is HDS. Tolerating clears, no BF yet. POD3    Diet: regular diet  Pain and nausea control   Glucose control & ISS   Chemical DVT ppx with enoxaparin and SCD  OOBA and to the chair   PT assessment   Vitals & I/O [id number]    Planning to dispo today to [facility]

## 2022-06-08 NOTE — DISCHARGE NOTE NURSING/CASE MANAGEMENT/SOCIAL WORK - PATIENT PORTAL LINK FT
You can access the FollowMyHealth Patient Portal offered by [facility] by registering at the following website: [email or web address]. By joining [employer]’s FollowMyHealth portal, you will also be able to view your health information using other applications (apps) compatible with our system.

## 2022-06-13 LAB — GLUCOSE BLDC GLUCOMTR-MCNC: 222 MG/DL — HIGH (ref 70–99)

## 2022-06-14 LAB
GLUCOSE BLDC GLUCOMTR-MCNC: 245 MG/DL — HIGH (ref 70–99)
GLUCOSE BLDC GLUCOMTR-MCNC: 263 MG/DL — HIGH (ref 70–99)

## 2022-09-07 NOTE — ED PROVIDER NOTE - NEUROLOGICAL, MLM
normal/well-groomed/no distress
Alert and oriented, no focal deficits, no motor or sensory deficits.

## 2022-12-20 NOTE — ED PROVIDER NOTE - PRO INTERPRETER NEED 2
Call your primary care doctor and the pulmonology clinic about the lung mass that was incidentally found on the CT scan.   English

## 2023-03-22 NOTE — H&P ADULT - PHARYNX
Cyclosporine Pregnancy And Lactation Text: This medication is Pregnancy Category C and it isn't know if it is safe during pregnancy. This medication is excreted in breast milk. normal/no redness/no discharge

## 2024-10-05 NOTE — ED PROVIDER NOTE - CROS ED GI ALL NEG
Pt arrives with complaints of left wrist pain. Pt states this pain has been intermittent for the last few months after lifting a vacuum . Pain worse with twisting and bending.    - - -

## (undated) DEVICE — SUT BIOSYN 4-0 18" P-12

## (undated) DEVICE — TUBING STRYKER PNEUMOSURE HEATED RTP

## (undated) DEVICE — LIGASURE MARYLAND JAW LAPAROSCOPIC SEALER 37CM

## (undated) DEVICE — DRAPE LIGHT HANDLE COVER BLUE

## (undated) DEVICE — TIP SCISSOR ENDO CUT

## (undated) DEVICE — GLV 8.5 PROTEXIS BLUE

## (undated) DEVICE — BLANKET WARMER UPPER ADULT

## (undated) DEVICE — PACK GENERAL LAPAROSCOPY

## (undated) DEVICE — SUT POLYSORB 0 30" GS-21 UNDYED

## (undated) DEVICE — SOL IRR POUR H2O 1500ML

## (undated) DEVICE — TROCAR COVIDIEN VERSAPORT OPTICAL BLADELESS 12MM STD

## (undated) DEVICE — D HELP - CLEARVIEW CLEARIFY SYSTEM

## (undated) DEVICE — FOR-ESU VALLEYLAB T7E15008DX: Type: DURABLE MEDICAL EQUIPMENT

## (undated) DEVICE — NDL HYPO SAFE 25G X 1.5"

## (undated) DEVICE — SOL INJ NS 0.9% 1000ML

## (undated) DEVICE — TROCAR COVIDIEN VERSAONE OPTICAL BLADELESS 5MM

## (undated) DEVICE — GLV 8 PROTEXIS

## (undated) DEVICE — MARKER SKIN MULTI TIP 6"

## (undated) DEVICE — ELCTR FOOT CONTROL L WIRE LAPAROSCOPIC

## (undated) DEVICE — SYR ASEPTO

## (undated) DEVICE — SUT POLYSORB 0 30" GU-46

## (undated) DEVICE — SUT HISTOACRYL BLUE

## (undated) DEVICE — WRAP COMPRESSION CALF MED

## (undated) DEVICE — TUBING STRYKEFLOW II SUCTION / IRRIGATOR

## (undated) DEVICE — NDL INSUFFLATION SURGINEEDLE 120MM

## (undated) DEVICE — BLADE SURGICAL #11 CARBON